# Patient Record
Sex: FEMALE | Race: BLACK OR AFRICAN AMERICAN | Employment: UNEMPLOYED | URBAN - METROPOLITAN AREA
[De-identification: names, ages, dates, MRNs, and addresses within clinical notes are randomized per-mention and may not be internally consistent; named-entity substitution may affect disease eponyms.]

---

## 2017-04-26 RX ORDER — HYDROCHLOROTHIAZIDE 25 MG/1
TABLET ORAL
Qty: 30 TABLET | Refills: 0 | Status: SHIPPED | OUTPATIENT
Start: 2017-04-26 | End: 2017-05-25

## 2017-05-12 ENCOUNTER — OFFICE VISIT (OUTPATIENT)
Dept: OBGYN CLINIC | Facility: CLINIC | Age: 52
End: 2017-05-12

## 2017-05-12 VITALS
DIASTOLIC BLOOD PRESSURE: 84 MMHG | HEART RATE: 84 BPM | BODY MASS INDEX: 34.49 KG/M2 | WEIGHT: 202 LBS | HEIGHT: 64 IN | SYSTOLIC BLOOD PRESSURE: 137 MMHG

## 2017-05-12 DIAGNOSIS — Z12.4 CERVICAL CANCER SCREENING: ICD-10-CM

## 2017-05-12 DIAGNOSIS — Z12.31 ENCOUNTER FOR SCREENING MAMMOGRAM FOR BREAST CANCER: ICD-10-CM

## 2017-05-12 DIAGNOSIS — Z01.419 ENCOUNTER FOR GYNECOLOGICAL EXAMINATION WITHOUT ABNORMAL FINDING: Primary | ICD-10-CM

## 2017-05-12 PROCEDURE — 99386 PREV VISIT NEW AGE 40-64: CPT | Performed by: OBSTETRICS & GYNECOLOGY

## 2017-05-12 NOTE — PROGRESS NOTES
Well Woman Exam    HPI:  The patient is a 48 yo female who presents for annual exam. She was seen lat by CAP in 2013. She has a long history of abnormal pap smears with last pap HSIL and LATONIA II-III on colpo.  She was to get LEEP and declined so follow up wa Rfl:   •  ibuprofen (MOTRIN) 800 MG Oral Tab, Take 1 tablet by mouth every 8 (eight) hours as needed for Pain., Disp: 30 tablet, Rfl: 2    ALLERGIES:  No Known Allergies      Review of Systems:  Constitutional:  Denies fevers and chills   Cardiovascular: close follow up discussed at length with the patient. 2. Ana Laura-Menopause  1. Plan to monitor menses  2. Continue with fan and layers for hot flashes at this time   3. Breast Health:     1.  Reviewed currently guidelines with the patient and to start [facility]

## 2017-05-16 ENCOUNTER — TELEPHONE (OUTPATIENT)
Dept: OBGYN CLINIC | Facility: CLINIC | Age: 52
End: 2017-05-16

## 2017-05-16 DIAGNOSIS — Z32.00 PREGNANCY EXAMINATION OR TEST, PREGNANCY UNCONFIRMED: Primary | ICD-10-CM

## 2017-05-16 NOTE — TELEPHONE ENCOUNTER
PT NOTIFIED OF RESULTS AND RECS. SHE IS AWARE TO DO A SERUM PREG TEST THE DAY BEFORE THE APPT SINCE SHE IS NOT MENOPAUSAL YET.  (LAST PERIOD WAS NOV 2016). ORDER PLACED.

## 2017-05-16 NOTE — TELEPHONE ENCOUNTER
----- Message from Sommer Faye MD sent at 5/16/2017 12:53 PM CDT -----  Please let patient know that she had another abnormal pap smear. She needs to have a colposcopy. It is very important she shows up and makes an appointment for this.  Please follow

## 2017-05-25 RX ORDER — HYDROCHLOROTHIAZIDE 25 MG/1
TABLET ORAL
Qty: 30 TABLET | Refills: 0 | Status: SHIPPED | OUTPATIENT
Start: 2017-05-25 | End: 2017-06-23

## 2017-06-07 ENCOUNTER — OFFICE VISIT (OUTPATIENT)
Dept: OBGYN CLINIC | Facility: CLINIC | Age: 52
End: 2017-06-07

## 2017-06-07 VITALS — BODY MASS INDEX: 34 KG/M2 | SYSTOLIC BLOOD PRESSURE: 160 MMHG | DIASTOLIC BLOOD PRESSURE: 85 MMHG | WEIGHT: 199 LBS

## 2017-06-07 DIAGNOSIS — R87.611 PAP SMEAR OF CERVIX WITH ASCUS, CANNOT EXCLUDE HGSIL: ICD-10-CM

## 2017-06-07 DIAGNOSIS — Z01.812 PRE-PROCEDURAL LABORATORY EXAMINATION: Primary | ICD-10-CM

## 2017-06-07 DIAGNOSIS — R87.611 PAPANICOLAOU SMEAR OF CERVIX WITH ATYPICAL SQUAMOUS CELLS CANNOT EXCLUDE HIGH GRADE SQUAMOUS INTRAEPITHELIAL LESION (ASC-H): ICD-10-CM

## 2017-06-07 PROCEDURE — 88305 TISSUE EXAM BY PATHOLOGIST: CPT | Performed by: OBSTETRICS & GYNECOLOGY

## 2017-06-07 PROCEDURE — 57454 BX/CURETT OF CERVIX W/SCOPE: CPT | Performed by: OBSTETRICS & GYNECOLOGY

## 2017-06-07 PROCEDURE — 81025 URINE PREGNANCY TEST: CPT | Performed by: OBSTETRICS & GYNECOLOGY

## 2017-06-07 PROCEDURE — 88341 IMHCHEM/IMCYTCHM EA ADD ANTB: CPT | Performed by: OBSTETRICS & GYNECOLOGY

## 2017-06-07 NOTE — PROCEDURES
Colpo w/Cx Biopsy and ECC    Pregnancy Results: negative from urine test   Consent signed. Procedure discussed with patient in detail including indication, risk, benefits, alternatives and complications. Indication: ASC-H and HPV positive.  LATONIA II-III

## 2017-06-14 ENCOUNTER — OFFICE VISIT (OUTPATIENT)
Dept: FAMILY MEDICINE CLINIC | Facility: CLINIC | Age: 52
End: 2017-06-14

## 2017-06-14 VITALS
SYSTOLIC BLOOD PRESSURE: 148 MMHG | TEMPERATURE: 99 F | HEART RATE: 93 BPM | HEIGHT: 64 IN | BODY MASS INDEX: 35 KG/M2 | DIASTOLIC BLOOD PRESSURE: 79 MMHG | RESPIRATION RATE: 20 BRPM | WEIGHT: 205 LBS

## 2017-06-14 DIAGNOSIS — R05.9 COUGH: ICD-10-CM

## 2017-06-14 PROCEDURE — 99212 OFFICE O/P EST SF 10 MIN: CPT | Performed by: FAMILY MEDICINE

## 2017-06-14 PROCEDURE — 99213 OFFICE O/P EST LOW 20 MIN: CPT | Performed by: FAMILY MEDICINE

## 2017-06-14 RX ORDER — IBUPROFEN 800 MG/1
800 TABLET ORAL EVERY 8 HOURS PRN
Qty: 30 TABLET | Refills: 2 | Status: SHIPPED | OUTPATIENT
Start: 2017-06-14 | End: 2019-05-14

## 2017-06-14 RX ORDER — AZITHROMYCIN 250 MG/1
TABLET, FILM COATED ORAL
Qty: 6 TABLET | Refills: 0 | Status: SHIPPED | OUTPATIENT
Start: 2017-06-14 | End: 2019-05-14

## 2017-06-14 RX ORDER — PROMETHAZINE HYDROCHLORIDE AND CODEINE PHOSPHATE 6.25; 1 MG/5ML; MG/5ML
SYRUP ORAL
Qty: 240 ML | Refills: 0 | Status: SHIPPED | OUTPATIENT
Start: 2017-06-14 | End: 2019-05-14

## 2017-06-14 NOTE — PROGRESS NOTES
HPI:    Patient ID: Malick Zhao is a 47year old female. HPI Comments: Pt presents with cold symptoms for 7 days. Pt has had intermittent coughing fits. No fevers. Pt has tried otc remedies without relief. Pt states no sick contacts.        Cough Pulmonary/Chest: Effort normal and breath sounds normal. No respiratory distress. She has no wheezes. She has no rales. Lymphadenopathy:     She has no cervical adenopathy. Vitals reviewed.              ASSESSMENT/PLAN:   Cough: ? Allergies;  - After

## 2017-06-15 ENCOUNTER — TELEPHONE (OUTPATIENT)
Dept: OBGYN CLINIC | Facility: CLINIC | Age: 52
End: 2017-06-15

## 2017-06-15 NOTE — TELEPHONE ENCOUNTER
Patient needs referral to [facility] (Dr. Rigo Delgado or Dr. Mando Judge) for LATONIA III on ECC. If due to insurance she cannot go there then please send to Dr. Donna Norman.

## 2017-06-15 NOTE — TELEPHONE ENCOUNTER
Reviewed LATONIA II-III on ECC with patient. Recommend cone for patient and to have this done by specialist Carissa Zambrano. Reviewed they will treat her the best and do what is needed to help her.  She agrees and referral was sent

## 2017-06-16 NOTE — TELEPHONE ENCOUNTER
p no longer approving referrals to [facility] onc patient will be referred to kp castorena or mark anthony.

## 2017-06-24 RX ORDER — HYDROCHLOROTHIAZIDE 25 MG/1
TABLET ORAL
Qty: 30 TABLET | Refills: 0 | Status: SHIPPED | OUTPATIENT
Start: 2017-06-24 | End: 2019-05-14

## 2017-07-19 ENCOUNTER — OFFICE VISIT (OUTPATIENT)
Dept: FAMILY MEDICINE CLINIC | Facility: CLINIC | Age: 52
End: 2017-07-19

## 2017-07-19 VITALS — HEIGHT: 64 IN

## 2017-07-19 DIAGNOSIS — E11.9 DIET-CONTROLLED DIABETES MELLITUS (HCC): ICD-10-CM

## 2017-07-19 DIAGNOSIS — R35.0 FREQUENT URINATION: ICD-10-CM

## 2017-07-19 LAB
BILIRUBIN URINE: NEGATIVE
CONTROL RUN WITHIN 24 HOURS?: YES
LEUKOCYTE ESTERASE URINE: NEGATIVE
NITRITE URINE: POSITIVE
PH URINE: 5 (ref 5–8)
SPEC GRAVITY: 1.01 (ref 1–1.03)
UROBILINOGEN URINE: 0.2

## 2017-07-19 PROCEDURE — 99213 OFFICE O/P EST LOW 20 MIN: CPT | Performed by: FAMILY MEDICINE

## 2017-07-19 PROCEDURE — 99212 OFFICE O/P EST SF 10 MIN: CPT | Performed by: FAMILY MEDICINE

## 2017-07-19 RX ORDER — NITROFURANTOIN 25; 75 MG/1; MG/1
100 CAPSULE ORAL 2 TIMES DAILY
Qty: 14 CAPSULE | Refills: 0 | Status: SHIPPED | OUTPATIENT
Start: 2017-07-19 | End: 2019-05-14

## 2017-07-19 NOTE — PROGRESS NOTES
HPI:    Patient ID: Brianne Valadez is a 47year old female. Pt has had UTI symptoms for a week. Burning with urination. No fevers or flank pain/ back pains. Pt has hx of diet controlled diabetes.            Review of Systems   Constitutional: Robin Chester will check hgba1c as discussed below; To call if any significant symptoms. Follow up and further management after testing.          Orders Placed This Encounter      Hemoglobin A1C [E]      POCT urinalysis dipstick    Meds This Visit:  Signed Prescriptions

## 2017-08-17 NOTE — TELEPHONE ENCOUNTER
LMTCB. Pt needs to inform us if she seeing Bethanie Sanders or Maki Galvan, so a Referral can be sent.   Referral Nurse will need to be informed, so she can send a Referral.

## 2017-08-22 ENCOUNTER — TELEPHONE (OUTPATIENT)
Dept: INTERNAL MEDICINE CLINIC | Facility: CLINIC | Age: 52
End: 2017-08-22

## 2017-10-04 ENCOUNTER — TELEPHONE (OUTPATIENT)
Dept: INTERNAL MEDICINE CLINIC | Facility: CLINIC | Age: 52
End: 2017-10-04

## 2017-10-16 ENCOUNTER — TELEPHONE (OUTPATIENT)
Dept: PEDIATRICS CLINIC | Facility: CLINIC | Age: 52
End: 2017-10-16

## 2017-10-16 DIAGNOSIS — D06.9 CIN III (CERVICAL INTRAEPITHELIAL NEOPLASIA GRADE III) WITH SEVERE DYSPLASIA: Primary | ICD-10-CM

## 2017-10-16 NOTE — TELEPHONE ENCOUNTER
SEE 6-15-17 PHONE ENCOUNTER. PT HAD A TRIP PLANNED FOR A LONG TIME THAT SHE WENT ON SO THAT IS WHY SHE IS CALLING BACK NOW. PT WAS REFERRED TO [facility]. [address] Vania Hogan 08, [id number] [facility]

## 2017-10-17 NOTE — TELEPHONE ENCOUNTER
Referral in process, patient informed. Patient given scheduling info for [facility].  [phone]

## 2017-10-25 NOTE — TELEPHONE ENCOUNTER
Lmtcb. Please relay info:    Patient's approved referral and medical records were routed to Dr. Richard Tony office at [phone]. Patient can call [phone]  To schedule.

## 2017-11-20 ENCOUNTER — TELEPHONE (OUTPATIENT)
Dept: OBGYN CLINIC | Facility: CLINIC | Age: 52
End: 2017-11-20

## 2017-11-20 NOTE — TELEPHONE ENCOUNTER
Consult note from Dr Mohan Hi dated 11/15/17 placed on [facility]'s desk for review.

## 2017-12-20 ENCOUNTER — TELEPHONE (OUTPATIENT)
Dept: OBGYN CLINIC | Facility: CLINIC | Age: 52
End: 2017-12-20

## 2017-12-20 NOTE — TELEPHONE ENCOUNTER
Received letter from [facility] dated 12/20/17. Placed on [address] for review.

## 2017-12-20 NOTE — TELEPHONE ENCOUNTER
Received letter from [facility]. Patient has failed to follow up. Please call her to reminder her to follow  Up with them as it is very important.  Certified letter can then be sent to her that she needs to follow up regarding Grade III

## 2017-12-28 NOTE — TELEPHONE ENCOUNTER
LMTCB-second call. Certified letter sent. Routed to Landon Chavez as an [place].

## 2017-12-28 NOTE — TELEPHONE ENCOUNTER
Pt states that her spouse lost his job so she lost her insurance. Pt states that she will have new insurance 1/18. Pt will follow up with them in January. Certified letter not sent since we were able to reach the pt.

## 2019-05-14 ENCOUNTER — OFFICE VISIT (OUTPATIENT)
Dept: FAMILY MEDICINE CLINIC | Facility: CLINIC | Age: 54
End: 2019-05-14
Payer: COMMERCIAL

## 2019-05-14 VITALS
HEART RATE: 80 BPM | RESPIRATION RATE: 18 BRPM | HEIGHT: 64 IN | SYSTOLIC BLOOD PRESSURE: 135 MMHG | WEIGHT: 196 LBS | DIASTOLIC BLOOD PRESSURE: 83 MMHG | TEMPERATURE: 98 F | BODY MASS INDEX: 33.46 KG/M2

## 2019-05-14 DIAGNOSIS — Z12.11 COLON CANCER SCREENING: ICD-10-CM

## 2019-05-14 DIAGNOSIS — L98.9 SKIN LESION: ICD-10-CM

## 2019-05-14 DIAGNOSIS — Z00.00 ROUTINE PHYSICAL EXAMINATION: Primary | ICD-10-CM

## 2019-05-14 DIAGNOSIS — I10 ESSENTIAL HYPERTENSION: ICD-10-CM

## 2019-05-14 DIAGNOSIS — Z12.31 SCREENING MAMMOGRAM, ENCOUNTER FOR: ICD-10-CM

## 2019-05-14 PROCEDURE — 99396 PREV VISIT EST AGE 40-64: CPT | Performed by: FAMILY MEDICINE

## 2019-05-14 RX ORDER — HYDROCHLOROTHIAZIDE 25 MG/1
25 TABLET ORAL
Qty: 90 TABLET | Refills: 3 | Status: SHIPPED | OUTPATIENT
Start: 2019-05-14 | End: 2021-04-20

## 2019-05-14 NOTE — PROGRESS NOTES
HPI:    Patient ID: Jason Choudhary is a 53year old female. Patient is here for routine physical exam. No acute issues. Patient is requesting blood testing. Past medical history, family history, and social history were reviewed.   Patient is here fo clear and moist.   Eyes: Pupils are equal, round, and reactive to light. Conjunctivae and EOM are normal.   Neck: Normal range of motion. Neck supple. Cardiovascular: Normal rate, regular rhythm, normal heart sounds and intact distal pulses.    Pulmonary/ daily.       Imaging & Referrals:  [facility] - INTERNAL  [facility]M - INTERNAL  [facility] SCREENING [facility]ILAT (CPT=[id number])       YJ#[id number]

## 2019-08-22 ENCOUNTER — OFFICE VISIT (OUTPATIENT)
Dept: FAMILY MEDICINE CLINIC | Facility: CLINIC | Age: 54
End: 2019-08-22
Payer: COMMERCIAL

## 2019-08-22 VITALS
HEIGHT: 64 IN | WEIGHT: 195 LBS | DIASTOLIC BLOOD PRESSURE: 79 MMHG | BODY MASS INDEX: 33.29 KG/M2 | TEMPERATURE: 99 F | RESPIRATION RATE: 18 BRPM | SYSTOLIC BLOOD PRESSURE: 136 MMHG | HEART RATE: 90 BPM

## 2019-08-22 DIAGNOSIS — R30.0 BURNING WITH URINATION: ICD-10-CM

## 2019-08-22 DIAGNOSIS — Z01.419 ROUTINE GYNECOLOGICAL EXAMINATION: ICD-10-CM

## 2019-08-22 LAB
BILIRUBIN URINE: NEGATIVE
CONTROL RUN WITHIN 24 HOURS?: YES
KETONE URINE: NEGATIVE
NITRITE URINE: NEGATIVE
PH URINE: 6 (ref 5–8)
SPEC GRAVITY: 1 (ref 1–1.03)
URINE COLOR: YELLOW
UROBILINOGEN URINE: 0.2

## 2019-08-22 PROCEDURE — 99213 OFFICE O/P EST LOW 20 MIN: CPT | Performed by: FAMILY MEDICINE

## 2019-08-22 RX ORDER — NITROFURANTOIN 25; 75 MG/1; MG/1
100 CAPSULE ORAL 2 TIMES DAILY
Qty: 14 CAPSULE | Refills: 0 | Status: SHIPPED | OUTPATIENT
Start: 2019-08-22 | End: 2021-04-20

## 2019-09-26 ENCOUNTER — NURSE TRIAGE (OUTPATIENT)
Dept: OTHER | Age: 54
End: 2019-09-26

## 2019-09-26 NOTE — TELEPHONE ENCOUNTER
Action Requested: Summary for Provider     []  Critical Lab, Recommendations Needed  [x] Need Additional Advice  []   FYI    []   Need Orders  [] Need Medications Sent to Pharmacy  []  Other     SUMMARY:   Patient calling stating she is having \"gas pain\"

## 2020-03-23 ENCOUNTER — NURSE TRIAGE (OUTPATIENT)
Dept: OTHER | Age: 55
End: 2020-03-23

## 2020-03-23 NOTE — TELEPHONE ENCOUNTER
Action Requested: Summary for Provider     []  Critical Lab, Recommendations Needed  [] Need Additional Advice  []   FYI    []   Need Orders  [] Need Medications Sent to Pharmacy  []  Other     SUMMARY: Travel Screening Completed    Patient started having

## 2020-04-03 ENCOUNTER — NURSE TRIAGE (OUTPATIENT)
Dept: OTHER | Age: 55
End: 2020-04-03

## 2020-04-03 DIAGNOSIS — J45.20 MILD INTERMITTENT ASTHMA WITHOUT COMPLICATION: ICD-10-CM

## 2020-04-03 DIAGNOSIS — Z20.822 EXPOSURE TO COVID-19 VIRUS: ICD-10-CM

## 2020-04-03 PROCEDURE — 99212 OFFICE O/P EST SF 10 MIN: CPT | Performed by: FAMILY MEDICINE

## 2020-04-03 NOTE — TELEPHONE ENCOUNTER
Action Requested: Summary for Provider     []  Critical Lab, Recommendations Needed  [] Need Additional Advice  []   FYI    []   Need Orders  [] Need Medications Sent to Pharmacy  []  Other     SUMMARY:     Per Covid 19 Protocol  The patient's  was

## 2020-04-04 NOTE — TELEPHONE ENCOUNTER
Attempted to call but no answer voice message left again. If sig symptoms to go to ER or urgent care. Can call back if questions/symptoms. Will be back in office on Monday. Otherwise to be addressed by on call MD. Will notify on call if pt calls back.   To

## 2020-04-07 RX ORDER — PROMETHAZINE HYDROCHLORIDE AND CODEINE PHOSPHATE 6.25; 1 MG/5ML; MG/5ML
SYRUP ORAL
Qty: 240 ML | Refills: 0 | Status: SHIPPED | OUTPATIENT
Start: 2020-04-07 | End: 2021-04-20

## 2020-04-07 RX ORDER — ALBUTEROL SULFATE 90 UG/1
2 AEROSOL, METERED RESPIRATORY (INHALATION) EVERY 4 HOURS PRN
Qty: 1 INHALER | Refills: 2 | Status: SHIPPED | OUTPATIENT
Start: 2020-04-07

## 2020-04-07 RX ORDER — PREDNISONE 20 MG/1
TABLET ORAL
Qty: 12 TABLET | Refills: 0 | Status: SHIPPED | OUTPATIENT
Start: 2020-04-07 | End: 2021-04-20

## 2020-04-07 NOTE — TELEPHONE ENCOUNTER
Pt rerunning calls; states had listened to all voicemail's including Dr Dominguez Parikh. [facility] has been controlling ti with an albuterol inhaler. States still having cough and \"controlling shortness of breath with albuterol inhaler. \"    Asking if Dr Felipe Molina can call

## 2020-04-07 NOTE — TELEPHONE ENCOUNTER
Virtual/Telephone Check-In    Meryle Childes verbally consents to a Virtual/Telephone Check-In service on 04/07/20. Patient understands and accepts financial responsibility for any deductible, co-insurance and/or co-pays associated with this service.

## 2020-04-13 ENCOUNTER — TELEPHONE (OUTPATIENT)
Dept: FAMILY MEDICINE CLINIC | Facility: CLINIC | Age: 55
End: 2020-04-13

## 2020-04-13 NOTE — TELEPHONE ENCOUNTER
Pt contacted and doing well. No sig asthma or resp symptoms. No fevers  Pt was exposed to COVID 19  - to call if any sig symptoms; To practice self isolation and quarantining as discussed. To continue social distancing and good hygiene.   Patient verbalize

## 2021-04-14 ENCOUNTER — IMMUNIZATION (OUTPATIENT)
Dept: LAB | Age: 56
End: 2021-04-14
Attending: HOSPITALIST
Payer: COMMERCIAL

## 2021-04-14 DIAGNOSIS — Z23 NEED FOR VACCINATION: Primary | ICD-10-CM

## 2021-04-14 PROCEDURE — 0001A SARSCOV2 VAC 30MCG/0.3ML IM: CPT

## 2021-04-20 ENCOUNTER — OFFICE VISIT (OUTPATIENT)
Dept: FAMILY MEDICINE CLINIC | Facility: CLINIC | Age: 56
End: 2021-04-20
Payer: COMMERCIAL

## 2021-04-20 VITALS
TEMPERATURE: 97 F | HEART RATE: 88 BPM | DIASTOLIC BLOOD PRESSURE: 82 MMHG | HEIGHT: 64 IN | WEIGHT: 197 LBS | BODY MASS INDEX: 33.63 KG/M2 | RESPIRATION RATE: 20 BRPM | SYSTOLIC BLOOD PRESSURE: 134 MMHG

## 2021-04-20 DIAGNOSIS — Z12.31 VISIT FOR SCREENING MAMMOGRAM: ICD-10-CM

## 2021-04-20 DIAGNOSIS — Z12.11 COLON CANCER SCREENING: ICD-10-CM

## 2021-04-20 DIAGNOSIS — Z00.00 ROUTINE PHYSICAL EXAMINATION: Primary | ICD-10-CM

## 2021-04-20 DIAGNOSIS — I10 ESSENTIAL HYPERTENSION: ICD-10-CM

## 2021-04-20 PROCEDURE — 99396 PREV VISIT EST AGE 40-64: CPT | Performed by: FAMILY MEDICINE

## 2021-04-20 PROCEDURE — 3008F BODY MASS INDEX DOCD: CPT | Performed by: FAMILY MEDICINE

## 2021-04-20 PROCEDURE — 3079F DIAST BP 80-89 MM HG: CPT | Performed by: FAMILY MEDICINE

## 2021-04-20 PROCEDURE — 3075F SYST BP GE 130 - 139MM HG: CPT | Performed by: FAMILY MEDICINE

## 2021-04-20 RX ORDER — MOMETASONE FUROATE 1 MG/G
OINTMENT TOPICAL
Qty: 1 TUBE | Refills: 2 | Status: SHIPPED | OUTPATIENT
Start: 2021-04-20

## 2021-04-20 NOTE — PROGRESS NOTES
HPI/Subjective:   Patient ID: Tadeo Jurado is a 61year old female. Patient is here for routine physical exam. No acute issues. No significant chronic medical problems. Patient is requesting blood testing. Diet and exercise have been good.  Pt has Tympanic membrane, ear canal and external ear normal.      Left Ear: Tympanic membrane, ear canal and external ear normal.      Nose: Nose normal.      Mouth/Throat:      Mouth: Mucous membranes are moist.      Pharynx: No posterior oropharyngeal erythema. hypertension: pt is managing with diet/ activity  - To monitor blood pressure at home; To call if any persistent elevation of blood pressure; Discussed good diet and activity; Follow up as needed.        Orders Placed This Encounter      Lipid Panel [E]

## 2021-04-21 ENCOUNTER — TELEPHONE (OUTPATIENT)
Dept: FAMILY MEDICINE CLINIC | Facility: CLINIC | Age: 56
End: 2021-04-21

## 2021-04-21 NOTE — TELEPHONE ENCOUNTER
Last annual physical 4-21-21 with . Mammogram re-ordered at that time. Last mammogram done 8-. Left detailed message offering to assist patient with scheduling her mammogram ordered by her pcp.    Encouraged to call the office or schedule

## 2021-05-05 ENCOUNTER — IMMUNIZATION (OUTPATIENT)
Dept: LAB | Age: 56
End: 2021-05-05
Attending: HOSPITALIST
Payer: COMMERCIAL

## 2021-05-05 DIAGNOSIS — Z23 NEED FOR VACCINATION: Primary | ICD-10-CM

## 2021-05-05 PROCEDURE — 0002A SARSCOV2 VAC 30MCG/0.3ML IM: CPT

## 2021-05-07 ENCOUNTER — TELEPHONE (OUTPATIENT)
Dept: FAMILY MEDICINE CLINIC | Facility: CLINIC | Age: 56
End: 2021-05-07

## 2021-05-10 ENCOUNTER — HOSPITAL ENCOUNTER (EMERGENCY)
Facility: HOSPITAL | Age: 56
Discharge: HOME | End: 2021-05-10
Attending: EMERGENCY MEDICINE
Payer: COMMERCIAL

## 2021-05-10 ENCOUNTER — APPOINTMENT (OUTPATIENT)
Dept: GENERAL RADIOLOGY | Facility: HOSPITAL | Age: 56
End: 2021-05-10
Attending: EMERGENCY MEDICINE
Payer: COMMERCIAL

## 2021-05-10 ENCOUNTER — APPOINTMENT (OUTPATIENT)
Dept: CT IMAGING | Facility: HOSPITAL | Age: 56
End: 2021-05-10
Attending: EMERGENCY MEDICINE
Payer: COMMERCIAL

## 2021-05-10 VITALS
HEART RATE: 82 BPM | SYSTOLIC BLOOD PRESSURE: 150 MMHG | OXYGEN SATURATION: 97 % | HEIGHT: 67 IN | DIASTOLIC BLOOD PRESSURE: 70 MMHG | BODY MASS INDEX: 30.79 KG/M2 | TEMPERATURE: 98 F | RESPIRATION RATE: 18 BRPM | WEIGHT: 196.19 LBS

## 2021-05-10 DIAGNOSIS — S46.912A SHOULDER STRAIN, LEFT, INITIAL ENCOUNTER: ICD-10-CM

## 2021-05-10 DIAGNOSIS — S46.911A SHOULDER STRAIN, RIGHT, INITIAL ENCOUNTER: ICD-10-CM

## 2021-05-10 DIAGNOSIS — S80.01XA CONTUSION OF RIGHT KNEE, INITIAL ENCOUNTER: ICD-10-CM

## 2021-05-10 DIAGNOSIS — S09.8XXA BLUNT HEAD TRAUMA, INITIAL ENCOUNTER: ICD-10-CM

## 2021-05-10 DIAGNOSIS — S93.402A MODERATE LEFT ANKLE SPRAIN, INITIAL ENCOUNTER: Primary | ICD-10-CM

## 2021-05-10 PROCEDURE — 73030 X-RAY EXAM OF SHOULDER: CPT | Performed by: EMERGENCY MEDICINE

## 2021-05-10 PROCEDURE — 73610 X-RAY EXAM OF ANKLE: CPT | Performed by: EMERGENCY MEDICINE

## 2021-05-10 PROCEDURE — 99284 EMERGENCY DEPT VISIT MOD MDM: CPT

## 2021-05-10 PROCEDURE — 70450 CT HEAD/BRAIN W/O DYE: CPT | Performed by: EMERGENCY MEDICINE

## 2021-05-10 RX ORDER — ORPHENADRINE CITRATE 100 MG/1
100 TABLET, EXTENDED RELEASE ORAL 2 TIMES DAILY PRN
Qty: 20 TABLET | Refills: 0 | Status: SHIPPED | OUTPATIENT
Start: 2021-05-10 | End: 2021-05-17

## 2021-05-10 RX ORDER — IBUPROFEN 600 MG/1
600 TABLET ORAL EVERY 6 HOURS PRN
Qty: 30 TABLET | Refills: 0 | Status: SHIPPED | OUTPATIENT
Start: 2021-05-10 | End: 2021-05-17

## 2021-05-10 NOTE — ED INITIAL ASSESSMENT (HPI)
Tripped and fell pta. C/o left ankle pain, right knee pain, and right shoulder pain. ambulatory with increased pain. +Cms to all extremities. Denies head injury/loc.

## 2021-05-11 NOTE — ED PROVIDER NOTES
Patient Seen in: [facility] AND [facility] Emergency Department      History   Patient presents with:  Fall    Stated Complaint: FALL    HPI/Subjective:   HPI    61year old female who had mechanical fall just pta hurting left ankle, R knee, bilateral shoulders Dorsalis pedis pulses are 2+ on the right side and 2+ on the left side. Pulmonary:      Effort: Pulmonary effort is normal. No respiratory distress. Musculoskeletal:      Right shoulder: Tenderness present. Normal range of motion. Normal strength. No acute fracture dislocation. 2. Glenohumeral degeneration. Moderate hypertrophic degeneration right AC joint. 3. Degenerative changes greater tuberosity.  4. Dextroscoliosis and multilevel thoracic spondylosis    Dictated by (CST): Shalonda Petersen MD times daily as needed (muscle spasm). Do not drive or operate heavy machinery while taking this medication. , Normal, Disp-20 tablet, R-0    lidocaine-menthol 4-1 % External Patch  Place 1 patch onto the skin Q24H PRN (pain). , Normal, Disp-30 patch, R-0

## 2021-07-29 NOTE — PROGRESS NOTES
HPI:    Patient ID: Fabio Gonsalez is a 53year old female. Pt has had UTI symptoms for a couple days. Burning with urination with frequency and urgency. No fevers or flank pain/ back pains. No GI symptoms.      Pt is also looking for a gynecologist times daily.        Imaging & Referrals:  [facility] - INTERNAL       XM#[id number] 29-Jul-2021 12:55

## 2021-11-30 ENCOUNTER — IMMUNIZATION (OUTPATIENT)
Dept: LAB | Facility: HOSPITAL | Age: 56
End: 2021-11-30
Attending: EMERGENCY MEDICINE
Payer: COMMERCIAL

## 2021-11-30 DIAGNOSIS — Z23 NEED FOR VACCINATION: Primary | ICD-10-CM

## 2021-11-30 PROCEDURE — 0004A SARSCOV2 VAC 30MCG/0.3ML IM: CPT

## 2022-02-09 ENCOUNTER — APPOINTMENT (OUTPATIENT)
Dept: GENERAL RADIOLOGY | Age: 57
End: 2022-02-09
Attending: NURSE PRACTITIONER
Payer: COMMERCIAL

## 2022-02-09 ENCOUNTER — HOSPITAL ENCOUNTER (OUTPATIENT)
Age: 57
Discharge: HOME | End: 2022-02-09
Payer: COMMERCIAL

## 2022-02-09 ENCOUNTER — NURSE TRIAGE (OUTPATIENT)
Dept: FAMILY MEDICINE CLINIC | Facility: CLINIC | Age: 57
End: 2022-02-09

## 2022-02-09 VITALS
BODY MASS INDEX: 31.58 KG/M2 | DIASTOLIC BLOOD PRESSURE: 70 MMHG | HEART RATE: 85 BPM | SYSTOLIC BLOOD PRESSURE: 159 MMHG | WEIGHT: 185 LBS | HEIGHT: 64 IN | RESPIRATION RATE: 16 BRPM | OXYGEN SATURATION: 100 % | TEMPERATURE: 98 F
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
## 2022-02-09 PROCEDURE — 99203 OFFICE O/P NEW LOW 30 MIN: CPT | Performed by: NURSE PRACTITIONER

## 2022-02-09 PROCEDURE — 73110 X-RAY EXAM OF WRIST: CPT | Performed by: NURSE PRACTITIONER

## 2022-02-09 NOTE — ED INITIAL ASSESSMENT (HPI)
Patient fell on Saturday walking out to her car and landed on her right hand and wrist.  She is having right wrist pain still and some swelling.

## 2022-02-11 ENCOUNTER — TELEPHONE (OUTPATIENT)
Dept: FAMILY MEDICINE CLINIC | Facility: CLINIC | Age: 57
End: 2022-02-11

## 2022-02-13 RX ORDER — IBUPROFEN 800 MG/1
800 TABLET ORAL EVERY 8 HOURS PRN
Qty: 30 TABLET | Refills: 2 | Status: SHIPPED | OUTPATIENT
Start: 2022-02-13

## 2022-06-28 ENCOUNTER — HOSPITAL ENCOUNTER (EMERGENCY)
Facility: HOSPITAL | Age: 57
Discharge: HOME | End: 2022-06-28
Attending: EMERGENCY MEDICINE
Payer: COMMERCIAL

## 2022-06-28 VITALS
SYSTOLIC BLOOD PRESSURE: 144 MMHG | WEIGHT: 185 LBS | TEMPERATURE: 98 F | BODY MASS INDEX: 31.58 KG/M2 | DIASTOLIC BLOOD PRESSURE: 90 MMHG | RESPIRATION RATE: 16 BRPM | HEIGHT: 64 IN | OXYGEN SATURATION: 99 % | HEART RATE: 82 BPM

## 2022-06-28 DIAGNOSIS — M25.511 ACUTE PAIN OF RIGHT SHOULDER: Primary | ICD-10-CM

## 2022-06-28 PROCEDURE — 99283 EMERGENCY DEPT VISIT LOW MDM: CPT

## 2022-06-28 PROCEDURE — 96372 THER/PROPH/DIAG INJ SC/IM: CPT

## 2022-06-28 RX ORDER — KETOROLAC TROMETHAMINE 10 MG/1
10 TABLET, FILM COATED ORAL EVERY 6 HOURS PRN
Qty: 20 TABLET | Refills: 0 | Status: SHIPPED | OUTPATIENT
Start: 2022-06-28 | End: 2022-07-03

## 2022-06-28 RX ORDER — HYDROCODONE BITARTRATE AND ACETAMINOPHEN 5; 325 MG/1; MG/1
1 TABLET ORAL EVERY 6 HOURS PRN
Qty: 15 TABLET | Refills: 0 | Status: SHIPPED | OUTPATIENT
Start: 2022-06-28

## 2022-06-28 RX ORDER — KETOROLAC TROMETHAMINE 30 MG/ML
30 INJECTION, SOLUTION INTRAMUSCULAR; INTRAVENOUS ONCE
Status: COMPLETED | OUTPATIENT
Start: 2022-06-28 | End: 2022-06-28

## 2022-06-28 RX ORDER — HYDROCODONE BITARTRATE AND ACETAMINOPHEN 5; 325 MG/1; MG/1
1 TABLET ORAL ONCE
Status: COMPLETED | OUTPATIENT
Start: 2022-06-28 | End: 2022-06-28

## 2022-06-28 NOTE — ED INITIAL ASSESSMENT (HPI)
Reports right shoulder pain started a couple of days ago. No known trauma. Radiates to right arm, denies numbness or tingling.

## 2022-06-29 ENCOUNTER — TELEPHONE (OUTPATIENT)
Dept: NEUROLOGY | Facility: CLINIC | Age: 57
End: 2022-06-29

## 2022-06-29 NOTE — TELEPHONE ENCOUNTER
Spoke to patient and ask if she was in need of on appointment and she states she will be reaching out to her PCP and the follow up with  if needed.

## 2022-07-08 ENCOUNTER — IMMUNIZATION (OUTPATIENT)
Dept: LAB | Age: 57
End: 2022-07-08
Attending: EMERGENCY MEDICINE
Payer: COMMERCIAL

## 2022-07-08 DIAGNOSIS — Z23 NEED FOR VACCINATION: Primary | ICD-10-CM

## 2022-07-08 PROCEDURE — 0054A SARSCOV2 VAC 30MCG TRS SUCR: CPT

## 2023-01-08 ENCOUNTER — IMMUNIZATION (OUTPATIENT)
Dept: LAB | Age: 58
End: 2023-01-08
Attending: EMERGENCY MEDICINE
Payer: COMMERCIAL

## 2023-01-08 DIAGNOSIS — Z23 NEED FOR VACCINATION: Primary | ICD-10-CM

## 2023-01-08 PROCEDURE — 0124A SARSCOV2 VAC BVL 30MCG/0.3ML: CPT

## 2023-03-24 ENCOUNTER — TELEPHONE (OUTPATIENT)
Dept: FAMILY MEDICINE CLINIC | Facility: CLINIC | Age: 58
End: 2023-03-24

## 2023-03-24 NOTE — TELEPHONE ENCOUNTER
Left message on pt voicemail to see if Dr. Maximino Arreola still her PCP if, so she needs to schedule Px with Dr. Maximino Arreola Last seen 4/20/2021. If not Please let us know so that we can remove Dr. Maximino Arreola as her PCP. Kelton Santos PSR or  please try patient again.   Thanks

## 2023-04-11 ENCOUNTER — OFFICE VISIT (OUTPATIENT)
Dept: FAMILY MEDICINE CLINIC | Facility: CLINIC | Age: 58
End: 2023-04-11

## 2023-04-11 VITALS
SYSTOLIC BLOOD PRESSURE: 134 MMHG | BODY MASS INDEX: 32.1 KG/M2 | DIASTOLIC BLOOD PRESSURE: 80 MMHG | HEIGHT: 64 IN | TEMPERATURE: 99 F | RESPIRATION RATE: 20 BRPM | HEART RATE: 81 BPM | WEIGHT: 188 LBS

## 2023-04-11 DIAGNOSIS — Z12.31 VISIT FOR SCREENING MAMMOGRAM: ICD-10-CM

## 2023-04-11 DIAGNOSIS — Z12.11 COLON CANCER SCREENING: ICD-10-CM

## 2023-04-11 DIAGNOSIS — Z00.00 ROUTINE PHYSICAL EXAMINATION: Primary | ICD-10-CM

## 2023-04-11 PROCEDURE — 3008F BODY MASS INDEX DOCD: CPT | Performed by: FAMILY MEDICINE

## 2023-04-11 PROCEDURE — 99396 PREV VISIT EST AGE 40-64: CPT | Performed by: FAMILY MEDICINE

## 2023-04-11 PROCEDURE — 3079F DIAST BP 80-89 MM HG: CPT | Performed by: FAMILY MEDICINE

## 2023-04-11 PROCEDURE — 3075F SYST BP GE 130 - 139MM HG: CPT | Performed by: FAMILY MEDICINE

## 2023-06-23 ENCOUNTER — NURSE TRIAGE (OUTPATIENT)
Dept: FAMILY MEDICINE CLINIC | Facility: CLINIC | Age: 58
End: 2023-06-23

## 2023-06-23 ENCOUNTER — HOSPITAL ENCOUNTER (EMERGENCY)
Facility: HOSPITAL | Age: 58
Discharge: HOME | End: 2023-06-23
Attending: EMERGENCY MEDICINE
Payer: COMMERCIAL

## 2023-06-23 VITALS
OXYGEN SATURATION: 100 % | RESPIRATION RATE: 16 BRPM | DIASTOLIC BLOOD PRESSURE: 95 MMHG | TEMPERATURE: 99 F | SYSTOLIC BLOOD PRESSURE: 171 MMHG | HEART RATE: 78 BPM

## 2023-06-23 DIAGNOSIS — I10 ASYMPTOMATIC HYPERTENSION: Primary | ICD-10-CM

## 2023-06-23 PROCEDURE — 99283 EMERGENCY DEPT VISIT LOW MDM: CPT

## 2023-06-23 PROCEDURE — 99284 EMERGENCY DEPT VISIT MOD MDM: CPT

## 2023-06-23 RX ORDER — AMLODIPINE BESYLATE 5 MG/1
5 TABLET ORAL DAILY
Qty: 14 TABLET | Refills: 0 | Status: SHIPPED | OUTPATIENT
Start: 2023-06-23 | End: 2023-07-07

## 2023-06-23 RX ORDER — AMLODIPINE BESYLATE 5 MG/1
5 TABLET ORAL ONCE
Status: COMPLETED | OUTPATIENT
Start: 2023-06-23 | End: 2023-06-23

## 2023-06-23 NOTE — ED INITIAL ASSESSMENT (HPI)
Patient ambulatory to triage, c/o high blood pressure - SBP in the 200's at home. Patient stated she has been starting to take an OTC medication called provitalize. Since she started taking it her blood pressure has been increasing.  BP of 178/90 in triage

## 2023-08-23 ENCOUNTER — LAB ENCOUNTER (OUTPATIENT)
Dept: LAB | Facility: HOSPITAL | Age: 58
End: 2023-08-23
Attending: FAMILY MEDICINE
Payer: COMMERCIAL

## 2023-08-23 ENCOUNTER — HOSPITAL ENCOUNTER (OUTPATIENT)
Dept: MAMMOGRAPHY | Facility: HOSPITAL | Age: 58
Discharge: HOME | End: 2023-08-23
Attending: FAMILY MEDICINE
Payer: COMMERCIAL

## 2023-08-23 DIAGNOSIS — Z00.00 ROUTINE PHYSICAL EXAMINATION: ICD-10-CM

## 2023-08-23 DIAGNOSIS — Z12.31 VISIT FOR SCREENING MAMMOGRAM: ICD-10-CM

## 2023-08-23 LAB
ALBUMIN SERPL-MCNC: 4 G/DL (ref 3.4–5)
ALBUMIN/GLOB SERPL: 1.3 {RATIO} (ref 1–2)
ALP LIVER SERPL-CCNC: 107 U/L
ALT SERPL-CCNC: 17 U/L
ANION GAP SERPL CALC-SCNC: 5 MMOL/L (ref 0–18)
AST SERPL-CCNC: 12 U/L (ref 15–37)
BILIRUB SERPL-MCNC: 0.5 MG/DL (ref 0.1–2)
BUN BLD-MCNC: 8 MG/DL (ref 7–18)
BUN/CREAT SERPL: 13.3 (ref 10–20)
CALCIUM BLD-MCNC: 9.4 MG/DL (ref 8.5–10.1)
CHLORIDE SERPL-SCNC: 101 MMOL/L (ref 98–112)
CHOLEST SERPL-MCNC: 194 MG/DL (ref ?–200)
CO2 SERPL-SCNC: 31 MMOL/L (ref 21–32)
CREAT BLD-MCNC: 0.6 MG/DL
DEPRECATED RDW RBC AUTO: 41.9 FL (ref 35.1–46.3)
EGFRCR SERPLBLD CKD-EPI 2021: 103 ML/MIN/1.73M2 (ref 60–?)
ERYTHROCYTE [DISTWIDTH] IN BLOOD BY AUTOMATED COUNT: 12.9 % (ref 11–15)
EST. AVERAGE GLUCOSE BLD GHB EST-MCNC: 301 MG/DL (ref 68–126)
FASTING PATIENT LIPID ANSWER: YES
FASTING STATUS PATIENT QL REPORTED: YES
GLOBULIN PLAS-MCNC: 3.1 G/DL (ref 2.8–4.4)
GLUCOSE BLD-MCNC: 259 MG/DL (ref 70–99)
HBA1C MFR BLD: 12.1 % (ref ?–5.7)
HCT VFR BLD AUTO: 42.5 %
HDLC SERPL-MCNC: 64 MG/DL (ref 40–59)
HGB BLD-MCNC: 13.5 G/DL
LDLC SERPL CALC-MCNC: 113 MG/DL (ref ?–100)
MCH RBC QN AUTO: 28.1 PG (ref 26–34)
MCHC RBC AUTO-ENTMCNC: 31.8 G/DL (ref 31–37)
MCV RBC AUTO: 88.4 FL
NONHDLC SERPL-MCNC: 130 MG/DL (ref ?–130)
OSMOLALITY SERPL CALC.SUM OF ELEC: 291 MOSM/KG (ref 275–295)
PLATELET # BLD AUTO: 224 10(3)UL (ref 150–450)
POTASSIUM SERPL-SCNC: 4.3 MMOL/L (ref 3.5–5.1)
PROT SERPL-MCNC: 7.1 G/DL (ref 6.4–8.2)
RBC # BLD AUTO: 4.81 X10(6)UL
SODIUM SERPL-SCNC: 137 MMOL/L (ref 136–145)
TRIGL SERPL-MCNC: 93 MG/DL (ref 30–149)
TSI SER-ACNC: 0.55 MIU/ML (ref 0.36–3.74)
VLDLC SERPL CALC-MCNC: 16 MG/DL (ref 0–30)
WBC # BLD AUTO: 6 X10(3) UL (ref 4–11)

## 2023-08-23 PROCEDURE — 80053 COMPREHEN METABOLIC PANEL: CPT

## 2023-08-23 PROCEDURE — 77067 SCR MAMMO BI INCL CAD: CPT | Performed by: FAMILY MEDICINE

## 2023-08-23 PROCEDURE — 80061 LIPID PANEL: CPT

## 2023-08-23 PROCEDURE — 36415 COLL VENOUS BLD VENIPUNCTURE: CPT

## 2023-08-23 PROCEDURE — 85027 COMPLETE CBC AUTOMATED: CPT

## 2023-08-23 PROCEDURE — 3046F HEMOGLOBIN A1C LEVEL >9.0%: CPT | Performed by: FAMILY MEDICINE

## 2023-08-23 PROCEDURE — 83036 HEMOGLOBIN GLYCOSYLATED A1C: CPT

## 2023-08-23 PROCEDURE — 77063 BREAST TOMOSYNTHESIS BI: CPT | Performed by: FAMILY MEDICINE

## 2023-08-23 PROCEDURE — 84443 ASSAY THYROID STIM HORMONE: CPT

## 2023-08-24 ENCOUNTER — TELEPHONE (OUTPATIENT)
Dept: FAMILY MEDICINE CLINIC | Facility: CLINIC | Age: 58
End: 2023-08-24

## 2023-08-24 NOTE — TELEPHONE ENCOUNTER
CSS- please assist with scheduling follow up appt with Dr Tova Calles as advised in result note. \"Pt to schedule appointment for follow up for elevated sugars/ diabetes. Will have triage staff to reach out to make appointment. Results released to [facility]"    [employer] message was also sent.

## 2023-08-28 NOTE — TELEPHONE ENCOUNTER
CSS, please assist     Please contact patient to schedule a follow up with Dr Dheeraj Davis. See Note below.

## 2023-09-11 ENCOUNTER — TELEPHONE (OUTPATIENT)
Dept: FAMILY MEDICINE CLINIC | Facility: CLINIC | Age: 58
End: 2023-09-11

## 2023-09-11 ENCOUNTER — OFFICE VISIT (OUTPATIENT)
Dept: FAMILY MEDICINE CLINIC | Facility: CLINIC | Age: 58
End: 2023-09-11

## 2023-09-11 ENCOUNTER — LAB ENCOUNTER (OUTPATIENT)
Dept: LAB | Facility: HOSPITAL | Age: 58
End: 2023-09-11
Attending: FAMILY MEDICINE
Payer: COMMERCIAL

## 2023-09-11 VITALS
RESPIRATION RATE: 16 BRPM | BODY MASS INDEX: 30.22 KG/M2 | HEART RATE: 78 BPM | WEIGHT: 177 LBS | SYSTOLIC BLOOD PRESSURE: 149 MMHG | HEIGHT: 64 IN | DIASTOLIC BLOOD PRESSURE: 80 MMHG | TEMPERATURE: 98 F

## 2023-09-11 DIAGNOSIS — E11.9 NEW ONSET TYPE 2 DIABETES MELLITUS (HCC): Primary | ICD-10-CM

## 2023-09-11 DIAGNOSIS — Z12.11 COLON CANCER SCREENING: ICD-10-CM

## 2023-09-11 DIAGNOSIS — I10 ESSENTIAL HYPERTENSION: ICD-10-CM

## 2023-09-11 DIAGNOSIS — R05.9 COUGH, UNSPECIFIED TYPE: ICD-10-CM

## 2023-09-11 LAB — HEMOCCULT STL QL: NEGATIVE

## 2023-09-11 PROCEDURE — 82274 ASSAY TEST FOR BLOOD FECAL: CPT

## 2023-09-11 PROCEDURE — 3008F BODY MASS INDEX DOCD: CPT | Performed by: FAMILY MEDICINE

## 2023-09-11 PROCEDURE — 99214 OFFICE O/P EST MOD 30 MIN: CPT | Performed by: FAMILY MEDICINE

## 2023-09-11 PROCEDURE — 3079F DIAST BP 80-89 MM HG: CPT | Performed by: FAMILY MEDICINE

## 2023-09-11 PROCEDURE — 3077F SYST BP >= 140 MM HG: CPT | Performed by: FAMILY MEDICINE

## 2023-09-11 RX ORDER — PROMETHAZINE HYDROCHLORIDE AND CODEINE PHOSPHATE 6.25; 1 MG/5ML; MG/5ML
SYRUP ORAL
Qty: 180 ML | Refills: 0 | Status: SHIPPED | OUTPATIENT
Start: 2023-09-11

## 2023-09-11 RX ORDER — AMLODIPINE BESYLATE 5 MG/1
5 TABLET ORAL DAILY
Qty: 30 TABLET | Refills: 2 | Status: SHIPPED | OUTPATIENT
Start: 2023-09-11

## 2023-09-11 RX ORDER — ALBUTEROL SULFATE 90 UG/1
2 AEROSOL, METERED RESPIRATORY (INHALATION) EVERY 4 HOURS PRN
Qty: 1 EACH | Refills: 2 | Status: SHIPPED | OUTPATIENT
Start: 2023-09-11

## 2023-09-11 RX ORDER — CODEINE PHOSPHATE AND GUAIFENESIN 10; 100 MG/5ML; MG/5ML
5 SOLUTION ORAL EVERY 6 HOURS PRN
Qty: 140 ML | Refills: 0 | Status: SHIPPED | OUTPATIENT
Start: 2023-09-11

## 2023-09-11 NOTE — TELEPHONE ENCOUNTER
Message noted: Chart reviewed and may change to cheratussin as requested. Script sent to listed pharmacy by secure method. Pt notified through [facility].

## 2023-09-11 NOTE — TELEPHONE ENCOUNTER
Pharmacy is calling to update Dr Qasim Kay that the medication is not available at any [facility].  Please advise as to another medication for the patient     promethazine-codeine 6.25-10 MG/5ML Oral Syrup

## 2023-09-11 NOTE — PROGRESS NOTES
Subjective:   Patient ID: Juan Newsome is a 60year old female. Pt is here to follow up to discuss treatment of diabetes. Pt is eating much better. Last hgba1c was over 12. Pt is here to discuss medication and does not want to take metformin. Pt also has hx of recent hypertension and was given amlodipine in ER which helped. Requesting refill. Pt also has had cough recently. No fevers or wheezing/ SOB. History/Other:   Review of Systems   Constitutional:  Negative for fever. Respiratory:  Positive for cough. Negative for shortness of breath and wheezing. Cardiovascular:  Negative for chest pain and leg swelling. Gastrointestinal:  Negative for abdominal pain. Current Outpatient Medications   Medication Sig Dispense Refill    amLODIPine 5 MG Oral Tab Take 1 tablet (5 mg total) by mouth daily. 30 tablet 2    empagliflozin (JARDIANCE) 10 MG Oral Tab Take 1 tablet (10 mg total) by mouth daily. 30 tablet 2    albuterol 108 (90 Base) MCG/ACT Inhalation Aero Soln Inhale 2 puffs into the lungs every 4 (four) hours as needed for Wheezing or Shortness of Breath. 1 each 2    promethazine-codeine 6.25-10 MG/5ML Oral Syrup Take 1  teaspoons by mouth every 6 hours as needed for cough 180 mL 0    ibuprofen 800 MG Oral Tab Take 1 tablet (800 mg total) by mouth every 8 (eight) hours as needed for Pain. 30 tablet 2     Allergies:No Known Allergies    Objective:   Physical Exam  Constitutional:       Appearance: Normal appearance. Cardiovascular:      Rate and Rhythm: Normal rate and regular rhythm. Pulses: Normal pulses. Heart sounds: Normal heart sounds. Pulmonary:      Effort: Pulmonary effort is normal.      Breath sounds: Normal breath sounds. Neurological:      Mental Status: She is alert. Psychiatric:         Mood and Affect: Mood normal.         Behavior: Behavior normal.         Assessment & Plan:   New onset type 2 diabetes mellitus: reviewed recent lab work.   - After discussion, will start jardiance as directed; discussed benefitis and potential side effects; will send to diabetes education center and endocrine for further education if needed; To call if any significant symptoms. Follow up in 1-2 months to recheck hgba1c. Discussed good diet and activity. Essential hypertension:  - Stable, Will check lab work as below; Medication renewed. Follow up and further management after testing. To monitor blood pressure; To call if any persistent elevation of blood pressure; Discussed good diet/activity; Routine follow up in 6-12 months or as needed. Cough, unspecified type:  - After discussion with patient, codeine cough syrup provided as directed and renewed albuterol MDI; To call if worse or not better; Follow up in one week if not resolved or as needed if worse. Orders Placed This Encounter      Hemoglobin A1C      Comp Metabolic Panel (14)      Meds This Visit:  Requested Prescriptions     Signed Prescriptions Disp Refills    amLODIPine 5 MG Oral Tab 30 tablet 2     Sig: Take 1 tablet (5 mg total) by mouth daily. empagliflozin (JARDIANCE) 10 MG Oral Tab 30 tablet 2     Sig: Take 1 tablet (10 mg total) by mouth daily.     albuterol 108 (90 Base) MCG/ACT Inhalation Aero Soln 1 each 2     Sig: Inhale 2 puffs into the lungs every 4 (four) hours as needed for Wheezing or Shortness of Breath.    promethazine-codeine 6.25-10 MG/5ML Oral Syrup 180 mL 0     Sig: Take 1  teaspoons by mouth every 6 hours as needed for cough       Imaging & Referrals:  ENDOCRINOLOGY - INTERNAL

## 2023-09-11 NOTE — TELEPHONE ENCOUNTER
Gm Jones MD   to [facility]         9/11/23  4:59 PM  Krish VALDEZ,     I got your message. I sent your prescription you requested to your pharmacy. Dr. Kan Rios    Last read by [facility] at  5:43 PM on 9/11/2023.

## 2023-09-11 NOTE — TELEPHONE ENCOUNTER
Dr. Meri Mendoza: Please advise if there is an alternative medication you would like patient to have for cough. [facility] do not have in stock. Patient did not want to call alternative pharmacies and said she will take alternative medication.

## 2023-09-27 ENCOUNTER — TELEPHONE (OUTPATIENT)
Dept: FAMILY MEDICINE CLINIC | Facility: CLINIC | Age: 58
End: 2023-09-27

## 2023-09-27 RX ORDER — BLOOD-GLUCOSE METER
EACH MISCELLANEOUS
Qty: 1 KIT | Refills: 0 | Status: SHIPPED | OUTPATIENT
Start: 2023-09-27

## 2023-09-27 RX ORDER — BLOOD SUGAR DIAGNOSTIC
STRIP MISCELLANEOUS
Qty: 100 EACH | Refills: 3 | Status: SHIPPED | OUTPATIENT
Start: 2023-09-27

## 2023-09-27 NOTE — TELEPHONE ENCOUNTER
Message noted: Chart reviewed and may refill diabetic supplies as requested. Prescription sent to listed pharmacy. Pharmacy to notify patient.    Pt notified through [facility]

## 2023-09-27 NOTE — TELEPHONE ENCOUNTER
Verified name and . Patient was seen in office with Dr. Kan Rios 23 during which she states Dr. Kan Rios advised her to start checking her blood sugars. She is requesting prescription for one touch ultra glucometer and testing strips.     Orders pended for your review and approval.

## 2023-10-20 RX ORDER — BLOOD SUGAR DIAGNOSTIC
STRIP MISCELLANEOUS
Qty: 100 EACH | Refills: 3 | Status: CANCELLED | OUTPATIENT
Start: 2023-10-20

## 2023-10-20 NOTE — TELEPHONE ENCOUNTER
Spoke to patient. She is asking if she can check her blood sugar three times a day. She is trying to get this under control and does not feel like once daily blood glucose checking is giving her much information. She will call pharmacy and ask them to fill the one Touch Ultra 100 strips but would like a TID order moving forward. Dr. Lidia Pineda, please advise if that is ok.

## 2023-10-21 RX ORDER — BLOOD SUGAR DIAGNOSTIC
STRIP MISCELLANEOUS
Qty: 300 EACH | Refills: 1 | Status: SHIPPED | OUTPATIENT
Start: 2023-10-21

## 2023-10-21 NOTE — TELEPHONE ENCOUNTER
New order test strips for testing 3 times per day sent to pharmacy. Left message to call back to go over instructions with patient.

## 2023-10-23 NOTE — TELEPHONE ENCOUNTER
[facility] active, message sent with Dr Valeriy Truong instruction. Disp Refills Start End    Glucose Blood (ONETOUCH ULTRA) In Vitro Strip 300 each 1 10/21/2023     Sig: Check blood glucose 3 times daily. Sent to pharmacy as: OneTouch Ultra In Vitro Strip (Glucose Blood)    Notes to Pharmacy: Diagnosis code: E11.9. Please dispense what patient's insurance will cover.     E-Prescribing Status: Receipt confirmed by pharmacy (10/21/2023  8:49 AM CDT)      Pharmacy    [place] [id number] #[id number] - [address] AT THE [facility] OF [address] [place] [address], [phone], [phone]

## 2023-10-23 NOTE — TELEPHONE ENCOUNTER
Patient has read [facility] on 10/23/23;      test strips  Message [phone]  From  Lizzy Harris RN To  Shavon Christensen and Delivered  10/23/2023 12:19 PM   Last Read in [facility]go  10/23/2023  1:19 PM by Reese Caputo

## 2023-11-01 ENCOUNTER — TELEPHONE (OUTPATIENT)
Dept: FAMILY MEDICINE CLINIC | Facility: CLINIC | Age: 58
End: 2023-11-01

## 2023-11-01 NOTE — TELEPHONE ENCOUNTER
Lvm and sent [employer] message for Patient to call back and schedule DM eye exam and HTN bp follow up.

## 2023-12-07 ENCOUNTER — TELEPHONE (OUTPATIENT)
Dept: FAMILY MEDICINE CLINIC | Facility: CLINIC | Age: 58
End: 2023-12-07

## 2023-12-07 NOTE — TELEPHONE ENCOUNTER
Patient got DM eye exam done at [facility] and will have them faxed results over. . will follow up. I charted Patient's daily bp follow up.

## 2023-12-14 RX ORDER — AMLODIPINE BESYLATE 5 MG/1
5 TABLET ORAL DAILY
Qty: 90 TABLET | Refills: 1 | Status: SHIPPED | OUTPATIENT
Start: 2023-12-14

## 2023-12-14 NOTE — TELEPHONE ENCOUNTER
Please review. Protocol failed / Has no protocol.      Requested Prescriptions   Pending Prescriptions Disp Refills    AMLODIPINE 5 MG Oral Tab [Pharmacy Med Name: AMLODIPINE BESYLATE 5MG TABLETS] 30 tablet 2     Sig: TAKE 1 TABLET(5 MG) BY MOUTH DAILY       Hypertensive Medications Protocol Failed - 12/13/2023 12:50 PM        Failed - Last BP reading less than 140/90     BP Readings from Last 1 Encounters:   09/11/23 149/80               Passed - In person appointment in the past 12 or next 3 months     Recent Outpatient Visits              3 months ago New onset type 2 diabetes mellitus ([facility]    [address] [place] [address], Eb Boyle MD    Office Visit    8 months ago Routine physical examination    Merrill Marcum MD    Office Visit    2 years ago Routine physical examination    Merrill Marcum MD    Office Visit    4 years ago Burning with urination    Merrill Marcum MD    Office Visit    4 years ago Routine physical examination    Merrill Marcum MD    Office Visit                      Passed - CMP or BMP in past 6 months     Recent Results (from the past 4392 hour(s))   Comp Metabolic Panel (14)    Collection Time: 08/23/23  4:08 PM   Result Value Ref Range    Glucose 259 (H) 70 - 99 mg/dL    Sodium 137 136 - 145 mmol/L    Potassium 4.3 3.5 - 5.1 mmol/L    Chloride 101 98 - 112 mmol/L    CO2 31.0 21.0 - 32.0 mmol/L    Anion Gap 5 0 - 18 mmol/L    BUN 8 7 - 18 mg/dL    Creatinine 0.60 0.55 - 1.02 mg/dL    BUN/CREA Ratio 13.3 10.0 - 20.0    Calcium, Total 9.4 8.5 - 10.1 mg/dL    Calculated Osmolality 291 275 - 295 mOsm/kg    eGFR-Cr 103 >=60 mL/min/1.73m2    ALT 17 13 - 56 U/L    AST 12 (L) 15 - 37 U/L    Alkaline Phosphatase 107 46 - 118 U/L Bilirubin, Total 0.5 0.1 - 2.0 mg/dL    Total Protein 7.1 6.4 - 8.2 g/dL    Albumin 4.0 3.4 - 5.0 g/dL    Globulin  3.1 2.8 - 4.4 g/dL    A/G Ratio 1.3 1.0 - 2.0    Patient Fasting for CMP? Yes      *Note: Due to a large number of results and/or encounters for the requested time period, some results have not been displayed. A complete set of results can be found in Results Review.                Passed - In person appointment or virtual visit in the past 6 months     Recent Outpatient Visits              3 months ago New onset type 2 diabetes mellitus ([facility]    Vi Gray MD    Office Visit    8 months ago Routine physical examination    Vi Gray MD    Office Visit    2 years ago Routine physical examination    Vi Gray MD    Office Visit    4 years ago Burning with urination    Vi Gray MD    Office Visit    4 years ago Routine physical examination    Vi Gray MD    Office Visit                      Passed - EGFRCR or GFRAA > 50     GFR Evaluation  EGFRCR: 103 , resulted on 8/23/2023            JARDIANCE 10 MG Oral Tab [Pharmacy Med Name: Javier Sinclair 10MG TABLETS] 30 tablet 2     Sig: TAKE 1 TABLET(10 MG) BY MOUTH DAILY       Diabetes Medication Protocol Failed - 12/13/2023 12:50 PM        Failed - Last A1C < 7.5 and within past 6 months     Lab Results   Component Value Date    A1C 12.1 (H) 08/23/2023             Passed - In person appointment or virtual visit in the past 6 mos or appointment in next 3 mos     Recent Outpatient Visits              3 months ago New onset type 2 diabetes mellitus ([facility]    Vi Gray MD    Office Visit    8 months ago Routine physical examination    Indy Ortega MD    Office Visit    2 years ago Routine physical examination    Indy Ortega MD    Office Visit    4 years ago Burning with urination    Indy Ortega MD    Office Visit    4 years ago Routine physical examination    Indy Ortega MD    Office Visit                      Passed - EGFRCR or GFRAA > 50     GFR Evaluation  EGFRCR: 103 , resulted on 8/23/2023          Passed - GFR in the past 12 months              Recent Outpatient Visits              3 months ago New onset type 2 diabetes mellitus [facility])    Indy Ortega MD    Office Visit    8 months ago Routine physical examination    Indy Ortega MD    Office Visit    2 years ago Routine physical examination    Indy Ortega MD    Office Visit    4 years ago Burning with urination    Indy Ortega MD    Office Visit    4 years ago Routine physical examination    Indy Ortega MD    Office Visit

## 2023-12-14 NOTE — TELEPHONE ENCOUNTER
Message noted: Chart reviewed and may refill medication as requested. Prescription sent to listed pharmacy. Pharmacy to notify patient.  Pt notified through [facility] [place] [facility] [place]

## 2023-12-15 ENCOUNTER — NURSE TRIAGE (OUTPATIENT)
Dept: FAMILY MEDICINE CLINIC | Facility: CLINIC | Age: 58
End: 2023-12-15

## 2023-12-15 ENCOUNTER — APPOINTMENT (OUTPATIENT)
Dept: CT IMAGING | Facility: HOSPITAL | Age: 58
End: 2023-12-15
Attending: EMERGENCY MEDICINE
Payer: COMMERCIAL

## 2023-12-15 ENCOUNTER — HOSPITAL ENCOUNTER (EMERGENCY)
Facility: HOSPITAL | Age: 58
Discharge: HOME | End: 2023-12-15
Attending: EMERGENCY MEDICINE
Payer: COMMERCIAL

## 2023-12-15 VITALS
OXYGEN SATURATION: 98 % | DIASTOLIC BLOOD PRESSURE: 87 MMHG | SYSTOLIC BLOOD PRESSURE: 178 MMHG | HEIGHT: 64 IN | RESPIRATION RATE: 16 BRPM | WEIGHT: 171 LBS | HEART RATE: 97 BPM | BODY MASS INDEX: 29.19 KG/M2 | TEMPERATURE: 98 F

## 2023-12-15 DIAGNOSIS — S39.012A STRAIN OF LUMBAR REGION, INITIAL ENCOUNTER: Primary | ICD-10-CM

## 2023-12-15 LAB
ALBUMIN SERPL-MCNC: 4.5 G/DL (ref 3.2–4.8)
ALBUMIN/GLOB SERPL: 1.7 {RATIO} (ref 1–2)
ALP LIVER SERPL-CCNC: 126 U/L
ALT SERPL-CCNC: 20 U/L
ANION GAP SERPL CALC-SCNC: 6 MMOL/L (ref 0–18)
AST SERPL-CCNC: 20 U/L (ref ?–34)
BASOPHILS # BLD AUTO: 0.08 X10(3) UL (ref 0–0.2)
BASOPHILS NFR BLD AUTO: 1 %
BILIRUB SERPL-MCNC: 0.4 MG/DL (ref 0.3–1.2)
BILIRUB UR QL: NEGATIVE
BUN BLD-MCNC: 10 MG/DL (ref 9–23)
BUN/CREAT SERPL: 13.7 (ref 10–20)
CALCIUM BLD-MCNC: 9.3 MG/DL (ref 8.7–10.4)
CHLORIDE SERPL-SCNC: 102 MMOL/L (ref 98–112)
CLARITY UR: CLEAR
CO2 SERPL-SCNC: 27 MMOL/L (ref 21–32)
CREAT BLD-MCNC: 0.73 MG/DL
DEPRECATED RDW RBC AUTO: 43.7 FL (ref 35.1–46.3)
EGFRCR SERPLBLD CKD-EPI 2021: 95 ML/MIN/1.73M2 (ref 60–?)
EOSINOPHIL # BLD AUTO: 0.23 X10(3) UL (ref 0–0.7)
EOSINOPHIL NFR BLD AUTO: 2.9 %
ERYTHROCYTE [DISTWIDTH] IN BLOOD BY AUTOMATED COUNT: 13.3 % (ref 11–15)
GLOBULIN PLAS-MCNC: 2.7 G/DL (ref 2.8–4.4)
GLUCOSE BLD-MCNC: 325 MG/DL (ref 70–99)
GLUCOSE UR-MCNC: >1000 MG/DL
HCT VFR BLD AUTO: 44 %
HGB BLD-MCNC: 14.3 G/DL
HGB UR QL STRIP.AUTO: NEGATIVE
IMM GRANULOCYTES # BLD AUTO: 0.03 X10(3) UL (ref 0–1)
IMM GRANULOCYTES NFR BLD: 0.4 %
KETONES UR-MCNC: 80 MG/DL
LEUKOCYTE ESTERASE UR QL STRIP.AUTO: NEGATIVE
LYMPHOCYTES # BLD AUTO: 1.92 X10(3) UL (ref 1–4)
LYMPHOCYTES NFR BLD AUTO: 24.6 %
MCH RBC QN AUTO: 28.9 PG (ref 26–34)
MCHC RBC AUTO-ENTMCNC: 32.5 G/DL (ref 31–37)
MCV RBC AUTO: 89.1 FL
MONOCYTES # BLD AUTO: 0.92 X10(3) UL (ref 0.1–1)
MONOCYTES NFR BLD AUTO: 11.8 %
NEUTROPHILS # BLD AUTO: 4.63 X10 (3) UL (ref 1.5–7.7)
NEUTROPHILS # BLD AUTO: 4.63 X10(3) UL (ref 1.5–7.7)
NEUTROPHILS NFR BLD AUTO: 59.3 %
NITRITE UR QL STRIP.AUTO: NEGATIVE
OSMOLALITY SERPL CALC.SUM OF ELEC: 292 MOSM/KG (ref 275–295)
PH UR: 5 [PH] (ref 5–8)
PLATELET # BLD AUTO: 207 10(3)UL (ref 150–450)
POTASSIUM SERPL-SCNC: 4 MMOL/L (ref 3.5–5.1)
PROT SERPL-MCNC: 7.2 G/DL (ref 5.7–8.2)
PROT UR-MCNC: NEGATIVE MG/DL
RBC # BLD AUTO: 4.94 X10(6)UL
SODIUM SERPL-SCNC: 135 MMOL/L (ref 136–145)
SP GR UR STRIP: >1.03 (ref 1–1.03)
UROBILINOGEN UR STRIP-ACNC: NORMAL
WBC # BLD AUTO: 7.8 X10(3) UL (ref 4–11)

## 2023-12-15 PROCEDURE — 85025 COMPLETE CBC W/AUTO DIFF WBC: CPT | Performed by: EMERGENCY MEDICINE

## 2023-12-15 PROCEDURE — 99284 EMERGENCY DEPT VISIT MOD MDM: CPT

## 2023-12-15 PROCEDURE — 81003 URINALYSIS AUTO W/O SCOPE: CPT | Performed by: EMERGENCY MEDICINE

## 2023-12-15 PROCEDURE — 74176 CT ABD & PELVIS W/O CONTRAST: CPT | Performed by: EMERGENCY MEDICINE

## 2023-12-15 PROCEDURE — 80053 COMPREHEN METABOLIC PANEL: CPT | Performed by: EMERGENCY MEDICINE

## 2023-12-15 PROCEDURE — 36415 COLL VENOUS BLD VENIPUNCTURE: CPT

## 2023-12-15 RX ORDER — LIDOCAINE 50 MG/G
1 PATCH TOPICAL EVERY 24 HOURS
Qty: 7 PATCH | Refills: 0 | Status: SHIPPED | OUTPATIENT
Start: 2023-12-15

## 2023-12-15 RX ORDER — CYCLOBENZAPRINE HCL 10 MG
10 TABLET ORAL 3 TIMES DAILY PRN
Qty: 20 TABLET | Refills: 0 | Status: SHIPPED | OUTPATIENT
Start: 2023-12-15 | End: 2023-12-22

## 2023-12-15 RX ORDER — CYCLOBENZAPRINE HCL 5 MG
10 TABLET ORAL ONCE
Status: COMPLETED | OUTPATIENT
Start: 2023-12-15 | End: 2023-12-15

## 2023-12-15 NOTE — ED INITIAL ASSESSMENT (HPI)
L side flank pain wrapping around to the front for two days ago. Denies urinary sx, n/v/d, or fevers. Hx of kidney stones but unsure if that is what is wrong.

## 2023-12-15 NOTE — TELEPHONE ENCOUNTER
Action Requested: Summary for Provider     []  Critical Lab, Recommendations Needed  [] Need Additional Advice  [x]   FYI    []   Need Orders  [] Need Medications Sent to Pharmacy  []  Other     SUMMARY: RN offered appointment, but patient desires imaging and blood work - would like to go to ER. Reason for call: Back Pain - Left upper back pain . Sharp pain at times  Onset: 2 days ago    Patient called office. Date of birth and full name both confirmed. Denies shortness of breath, difficulty breathing, chest pain, chest pressure. Asking if this is her kidney. But also used 5 lb weights recently for exercise. Triaged per protocol and advised care advice per protocol. More details regarding Symptoms under Additional Documentation > Protocols Used. Evaluation advised today, offered visit. Pt agreeable at first, but also desires blood tests and imaging to see if kidney is okay. Patient opts to go to Emergency Room. Care Advice             Patient/Caregiver understands and will follow care advice?: Yes, with modifications                        SEE IN OFFICE TODAY OR TOMORROW:   * You need to be examined. Let me give you an appointment.     Go to ER or Immediate Care if:    * Numbness or weakness occurs, or bowel/bladder problems  * Pain becomes worse  * You become worse                       Reason for Disposition   Age > 50 and no history of prior similar back pain    Protocols used: Back Pain-A-[place]

## 2024-01-26 ENCOUNTER — TELEPHONE (OUTPATIENT)
Dept: FAMILY MEDICINE CLINIC | Facility: CLINIC | Age: 59
End: 2024-01-26

## 2024-01-26 DIAGNOSIS — H53.8 BLURRED VISION, BILATERAL: Primary | ICD-10-CM

## 2024-01-26 DIAGNOSIS — E11.9 TYPE 2 DIABETES MELLITUS WITHOUT COMPLICATION, WITHOUT LONG-TERM CURRENT USE OF INSULIN (HCC): ICD-10-CM

## 2024-01-26 NOTE — TELEPHONE ENCOUNTER
Pt states she went to an optometrist who advised her to see an ophthalmologist due to pt having double vision and pt did see Dr Herman.  Per pt ophthalmologist stated due to diabetes pt may have nerve damage and advised MRI.  Pt did not care for the ophthalmologist she saw and did not have MRI done.    Pt is asking if Dr Becerril thinks she needs to follow up with opthalmology and if so can he refer her to one in the [facility] network.  Please advise    If ophthalmology is advised referral is pended.

## 2024-01-27 NOTE — TELEPHONE ENCOUNTER
Referral request noted. Referral approved, signed and sent to [facility]. Can see Dr Krishnan or Dr Petersen/ Vivek who also work out of [facility].

## 2024-01-31 ENCOUNTER — OFFICE VISIT (OUTPATIENT)
Dept: OPHTHALMOLOGY | Facility: CLINIC | Age: 59
End: 2024-01-31

## 2024-01-31 DIAGNOSIS — H53.2 DIPLOPIA: ICD-10-CM

## 2024-01-31 DIAGNOSIS — H25.13 AGE-RELATED NUCLEAR CATARACT OF BOTH EYES: ICD-10-CM

## 2024-01-31 DIAGNOSIS — E11.9 TYPE 2 DIABETES MELLITUS WITHOUT RETINOPATHY (HCC): Primary | ICD-10-CM

## 2024-01-31 DIAGNOSIS — H49.22 6TH NERVE PALSY, LEFT: ICD-10-CM

## 2024-01-31 PROCEDURE — 92004 COMPRE OPH EXAM NEW PT 1/>: CPT | Performed by: OPHTHALMOLOGY

## 2024-01-31 PROCEDURE — 3072F LOW RISK FOR RETINOPATHY: CPT | Performed by: OPHTHALMOLOGY

## 2024-01-31 PROCEDURE — 2023F DILAT RTA XM W/O RTNOPTHY: CPT | Performed by: OPHTHALMOLOGY

## 2024-01-31 NOTE — PROGRESS NOTES
Terry Jaimes is a 59 year old female.    HPI:     HPI    New patient here for a diabetic exam per Dr. Becerril.  She states she woke up on 1/23/24 with horizontal double vision.  Patient also notices her left eye is turning in towards her nose since 1/26/24.  She also complains of headaches and pain behind her left eye.  Patient was seen at [facility] on 1/23/24 she has plano glasses on order with prism ground in [place].  Patient was seen by Dr. Marquez @ [facility] 01/24/24. He recommended and MRI, but patient is here for a second opinion.  Patient wears soft contact lenses.      Pt has been a diabetic for 4 months       Pt's diabetes is currently controlled by pills  Pt checks BS 4 times a day   Pt's last blood sugar was  136 this morning  Last HA1C was 6.9 on on 1/30/24  Endocrinologist: Dr. Efrain Galeano (pt has an appt on 2/05/24)    Last edited by Yesika Santamaria OT on 1/31/2024  1:34 PM.        Patient History:  Past Medical History:   Diagnosis Date    Breast hypertrophy 2010    reduction mammoplasty 10/2010    Hypertension     Lipid screening 4/17/2014    per NG    Mild intermittent asthma     drug therapy    Nephrolithiasis 1995    lithotripsy       Surgical History: Terry Jaimes has a past surgical history that includes lithotripsy (01/01/1995); reduction of large breast (01/01/2010); reduction left (2010); and reduction right (2010).    Family History   Problem Relation Age of Onset    Macular degeneration Mother     Diabetes Mother 68    Hypertension Other         family h/o    Glaucoma Neg        Social History:   Social History     Socioeconomic History    Marital status:    Tobacco Use    Smoking status: Never    Smokeless tobacco: Never   Vaping Use    Vaping Use: Never used   Substance and Sexual Activity    Alcohol use: No    Drug use: Never   Other Topics Concern    Caffeine Concern Yes     Comment: soda-16 oz./day       Medications:  Current Outpatient Medications    Medication Sig Dispense Refill    lidocaine 5 % External Patch Place 1 patch onto the skin daily. 7 patch 0    amLODIPine 5 MG Oral Tab Take 1 tablet (5 mg total) by mouth daily. 90 tablet 1    empagliflozin (JARDIANCE) 10 MG Oral Tab Take 1 tablet (10 mg total) by mouth daily. 90 tablet 1    Glucose Blood (ONETOUCH ULTRA) In Vitro Strip Check blood glucose 3 times daily. 300 each 1    Blood Glucose Monitoring Suppl (ONETOUCH ULTRA 2) w/Device Does not apply Kit Use to check blood sugar once daily. 1 kit 0    albuterol 108 (90 Base) MCG/ACT Inhalation Aero Soln Inhale 2 puffs into the lungs every 4 (four) hours as needed for Wheezing or Shortness of Breath. 1 each 2    promethazine-codeine 6.25-10 MG/5ML Oral Syrup Take 1  teaspoons by mouth every 6 hours as needed for cough 180 mL 0    guaiFENesin-codeine (CHERATUSSIN AC) 100-10 MG/5ML Oral Solution Take 5 mL by mouth every 6 (six) hours as needed for cough. Medication may causes sedation. Not to operate heavy machinery or drive on medication. 140 mL 0    ibuprofen 800 MG Oral Tab Take 1 tablet (800 mg total) by mouth every 8 (eight) hours as needed for Pain. 30 tablet 2       Allergies:  No Known Allergies    ROS:     ROS    Positive for: Endocrine, Eyes  Negative for: Constitutional, Gastrointestinal, Neurological, Skin, Genitourinary, Musculoskeletal, HENT, Cardiovascular, Respiratory, Psychiatric, Allergic/Imm, Heme/Lymph  Last edited by Yesika Santamaria OT on 1/31/2024  1:16 PM.          PHYSICAL EXAM:     Base Eye Exam       Visual Acuity (Snellen - Linear)         Right Left    Dist cc 20/20 20/20    Near cc 20/20 20/20      Correction: Contacts              Tonometry (Icare, 1:28 PM)         Right Left    Pressure 20 20              Pupils         Pupils    Right PERRL    Left PERRL              Visual Fields         Left Right     Full Full              Extraocular Movement    OD ortho  OS ET             Dilation       Both eyes: 1.0% Mydriacyl and 2.5%  Jesse Synephrine @ 1:28 PM              Dilation #2       Both eyes: 1.0% Mydriacyl and 2.5% Jesse Synephrine @ 1:28 PM                  Slit Lamp and Fundus Exam       Slit Lamp Exam         Right Left    Lids/Lashes Dermatochalasis, Meibomian gland dysfunction Dermatochalasis, Meibomian gland dysfunction    Conjunctiva/Sclera Nasal/temp pinguecula Temp pinguecula, Ocular Melanosis nasally    Cornea 2+ Arcus 2+ Arcus    Anterior Chamber Deep and quiet Deep and quiet    Iris Normal Normal    Lens 2+ Nuclear sclerosis 2+ Nuclear sclerosis    Vitreous Clear Clear              Fundus Exam         Right Left    Disc Sloping margin, Temporal crescent, Tilted disc Sloping margin, Temporal crescent, Tilted disc    C/D Ratio 0.85 0.85    Macula Normal-no BDR Normal-no BDR    Vessels Normal Normal    Periphery Normal Normal                  Refraction       Wearing Rx         Sphere Cylinder    Right +1.75 Sphere    Left +1.75 Sphere      Type: OTC reading only              Manifest Refraction (Auto)         Sphere Cylinder Axis    Right -8.00 +1.25 005    Left -6.50 Sphere    Patient is happy with her contacts and OTC reading glasses                    ASSESSMENT/PLAN:     Diagnoses and Plan:     Type 2 diabetes mellitus without retinopathy (HCC)  Diabetes type II: no background of retinopathy, no signs of neovascularization noted.  Discussed ocular and systemic benefits of blood sugar control.  Diagnosis and treatment discussed in detail with patient.    Will see patient in 1 year for a diabetic exam    Age-related nuclear cataract of both eyes  Discussed mild cataracts with patient.  No treatment recommended at this time.       6th nerve palsy, left  Diagnosis discussed with patient.  Recommend evaluation with Dr. Sales    Diplopia  Will see Dr. Sales on 2/02/24    No orders of the defined types were placed in this encounter.      Meds This Visit:  Requested Prescriptions      No prescriptions requested or ordered in this  encounter        Follow up instructions:  Return in about 2 days (around 2/2/2024) for Diplopia evaluation then 1 year for a diabetic exam.    1/31/2024  Scribed by: Orion Krishnan MD

## 2024-01-31 NOTE — PATIENT INSTRUCTIONS
Type 2 diabetes mellitus without retinopathy (HCC)  Diabetes type II: no background of retinopathy, no signs of neovascularization noted.  Discussed ocular and systemic benefits of blood sugar control.  Diagnosis and treatment discussed in detail with patient.    Will see patient in 1 year for a diabetic exam    Age-related nuclear cataract of both eyes  Discussed mild cataracts with patient.  No treatment recommended at this time.       6th nerve palsy, left  Diagnosis discussed with patient.  Recommend evaluation with Dr. Sales    Diplopia  Will see Dr. Sales on 2/02/24

## 2024-02-02 ENCOUNTER — OFFICE VISIT (OUTPATIENT)
Dept: OPHTHALMOLOGY | Facility: CLINIC | Age: 59
End: 2024-02-02

## 2024-02-02 DIAGNOSIS — H25.13 AGE-RELATED NUCLEAR CATARACT OF BOTH EYES: ICD-10-CM

## 2024-02-02 DIAGNOSIS — E11.9 TYPE 2 DIABETES MELLITUS WITHOUT RETINOPATHY (HCC): ICD-10-CM

## 2024-02-02 DIAGNOSIS — H49.22 6TH NERVE PALSY, LEFT: Primary | ICD-10-CM

## 2024-02-02 DIAGNOSIS — H53.2 DIPLOPIA: ICD-10-CM

## 2024-02-02 DIAGNOSIS — H52.203 MYOPIA OF BOTH EYES WITH ASTIGMATISM: ICD-10-CM

## 2024-02-02 DIAGNOSIS — H52.13 MYOPIA OF BOTH EYES WITH ASTIGMATISM: ICD-10-CM

## 2024-02-02 PROCEDURE — 92014 COMPRE OPH EXAM EST PT 1/>: CPT | Performed by: OPHTHALMOLOGY

## 2024-02-02 PROCEDURE — 92060 SENSORIMOTOR EXAMINATION: CPT | Performed by: OPHTHALMOLOGY

## 2024-02-04 PROBLEM — H52.203 MYOPIA OF BOTH EYES WITH ASTIGMATISM: Status: ACTIVE | Noted: 2024-02-04

## 2024-02-04 PROBLEM — H52.13 MYOPIA OF BOTH EYES WITH ASTIGMATISM: Status: ACTIVE | Noted: 2024-02-04

## 2024-02-04 NOTE — ASSESSMENT & PLAN NOTE
Left CN palsy probably vascular secondary to Diabetes and Hypertension. However, due to recent onset of Diabetes, recommend MRI brain and orbits. MRI was Ordered.  To alleviate diplopia, 15 PD Fresnel prism placed on Left lens base out on prism glasses with 12 Base Out ground in, split 6 and 6.  Therefore a total of 27 Base Out prism to see single at distance in primary position.

## 2024-02-04 NOTE — PATIENT INSTRUCTIONS
6th nerve palsy, left  Left CN palsy probably vascular secondary to Diabetes and Hypertension. However, due to recent onset of Diabetes, recommend MRI brain and orbits. MRI was Ordered.  To alleviate diplopia, 15 PD Fresnel prism placed on Left lens base out on prism glasses with 12 Base Out ground in, split 6 and 6.  Therefore a total of 27 Base Out prism to see single at distance in primary position.    Diplopia  Horizontal diplopia secondary to Left CN6th palsy.  15 LEODAN Fresnel prism applied to Left lens of prism glasses that have 12 LEODAN split 6 and 6. Sees single in the distance    Type 2 diabetes mellitus without retinopathy (HCC)  Approximately 6 month history of Diabetes diagnosed. No retinopathy on dilated exam with Dr. Krishnan on 1/31/24.    Age-related nuclear cataract of both eyes  Mild, no treatment.    Myopia of both eyes with astigmatism  Same glasses and contacts.

## 2024-02-04 NOTE — PROGRESS NOTES
Terry Jaimes is a 59 year old female.    HPI:     HPI    EP/59 year old here for a diplopia evaluation per Dr. Krishnan.  History of Diabetes for 6 months or longer. Also Hypertension recently on meds. Under the care of Dr. Becerril. To see an Endocrinologist on 2/4/24. LDE was 1/31/24 with Dr. Krishnan.  Diagnoses that day were diplopia, 6th nerve palsy OS, age related cataract OU and type 2 diabetes mellitus no retinopathy.   HPI: She states she woke up on 1/23/24 with horizontal double vision worse in left gaze. Left eye is turning in towards her nose.   She also complains of headaches and pain behind her left eye.  Patient was seen at [facility] on 1/23/24 she has new ground in prism from [facility].  She feels the double vision is improved with the glasses but she still has double vision.   Her last A1C 6.9 on on 1/30/24.  Horizontal double vision worse on left gaze. When looking to the right she sees single.    Last edited by Ana Sales MD on 2/4/2024  5:18 PM.        Patient History:  Past Medical History:   Diagnosis Date    Breast hypertrophy 2010    reduction mammoplasty 10/2010    Hypertension     Lipid screening 4/17/2014    per NG    Mild intermittent asthma     drug therapy    Nephrolithiasis 1995    lithotripsy       Surgical History: Terry Jaimes has a past surgical history that includes lithotripsy (01/01/1995); reduction of large breast (01/01/2010); reduction left (2010); and reduction right (2010).    Family History   Problem Relation Age of Onset    Macular degeneration Mother     Diabetes Mother 68    Hypertension Other         family h/o    Glaucoma Neg        Social History:   Social History     Socioeconomic History    Marital status:    Tobacco Use    Smoking status: Never    Smokeless tobacco: Never   Vaping Use    Vaping Use: Never used   Substance and Sexual Activity    Alcohol use: No    Drug use: Never   Other Topics Concern    Caffeine Concern Yes      Comment: soda-16 oz./day       Medications:  Current Outpatient Medications   Medication Sig Dispense Refill    lidocaine 5 % External Patch Place 1 patch onto the skin daily. 7 patch 0    amLODIPine 5 MG Oral Tab Take 1 tablet (5 mg total) by mouth daily. 90 tablet 1    empagliflozin (JARDIANCE) 10 MG Oral Tab Take 1 tablet (10 mg total) by mouth daily. 90 tablet 1    Glucose Blood (ONETOUCH ULTRA) In Vitro Strip Check blood glucose 3 times daily. 300 each 1    Blood Glucose Monitoring Suppl (ONETOUCH ULTRA 2) w/Device Does not apply Kit Use to check blood sugar once daily. 1 kit 0    albuterol 108 (90 Base) MCG/ACT Inhalation Aero Soln Inhale 2 puffs into the lungs every 4 (four) hours as needed for Wheezing or Shortness of Breath. 1 each 2    promethazine-codeine 6.25-10 MG/5ML Oral Syrup Take 1  teaspoons by mouth every 6 hours as needed for cough 180 mL 0    guaiFENesin-codeine (CHERATUSSIN AC) 100-10 MG/5ML Oral Solution Take 5 mL by mouth every 6 (six) hours as needed for cough. Medication may causes sedation. Not to operate heavy machinery or drive on medication. 140 mL 0    ibuprofen 800 MG Oral Tab Take 1 tablet (800 mg total) by mouth every 8 (eight) hours as needed for Pain. 30 tablet 2       Allergies:  No Known Allergies    ROS:       PHYSICAL EXAM:     Base Eye Exam       Visual Acuity (Snellen - Linear)         Right Left    Dist cc 20/20 20/20      Correction: Contacts              Tonometry (Applanation, 9:08 AM)         Right Left    Pressure 16 16              Pupils         Pupils APD    Right PERRL None    Left PERRL None              Visual Fields (Counting fingers)         Left Right     Full Full                  Strabismus Exam       Method: Cover-uncover      Distance Near Near +3DS N Bifocals    LET 35                  - - - - - -   - - - - - -                      LET 14 - -  - -  LET 35 - -  - -  LET >40                     - - - - - -   - - - - - -                EOM's: LE cannot  abduct left eye past mid line, good elevation, good depression, marked limitation of abduction of left eye , slight head turn left  RE Full ductions     Patient likes 15BO over prism glasses which have 12 LEODAN total. Sees single at distance in primary position. Therefore a total of 27PD Base out prism to see single. Patient has slight head turn to left.       Slit Lamp and Fundus Exam       External Exam         Right Left    External Normal Normal              Slit Lamp Exam         Right Left    Lids/Lashes Dermatochalasis, Meibomian gland dysfunction Dermatochalasis, Meibomian gland dysfunction    Conjunctiva/Sclera Nasal/temp pinguecula Temp pinguecula, Ocular Melanosis nasally    Cornea 2+ Arcus 2+ Arcus    Anterior Chamber Deep and quiet Deep and quiet    Iris Normal Normal    Lens 1-2+ Nuclear sclerosis 1-2+ Nuclear sclerosis    Vitreous Clear Clear              Fundus Exam         Right Left    Disc Sloping margin, Temporal crescent, Tilted disc Sloping margin, Temporal crescent, Tilted disc    C/D Ratio 0.6 0.6    Macula Normal-no BDR Normal-no BDR    Vessels Normal Normal                  Refraction       Wearing Rx         Sphere Cylinder Horz Prism    Right [place] Sphere 6.0 out    Left [place] Sphere 6.0 out              Manifest Refraction (Auto)         Sphere Cylinder Axis    Right -8.00 +1.25 005    Left -6.50 Sphere                      ASSESSMENT/PLAN:     Diagnoses and Plan:     6th nerve palsy, left  Left CN palsy probably vascular secondary to Diabetes and Hypertension. However, due to recent onset of Diabetes, recommend MRI brain and orbits. MRI was Ordered.  To alleviate diplopia, 15 PD Fresnel prism placed on Left lens base out on prism glasses with 12 Base Out ground in, split 6 and 6.  Therefore a total of 27 Base Out prism to see single at distance in primary position.    Diplopia  Horizontal diplopia secondary to Left CN6th palsy.  15 LEODAN Fresnel prism applied to Left lens of prism glasses  that have 12 LEODAN split 6 and 6. Sees single in the distance    Type 2 diabetes mellitus without retinopathy (HCC)  Approximately 6 month history of Diabetes diagnosed. No retinopathy on dilated exam with Dr. Krishnan on 1/31/24.    Age-related nuclear cataract of both eyes  Mild, no treatment.    Myopia of both eyes with astigmatism  Same glasses and contacts.    No orders of the defined types were placed in this encounter.      Meds This Visit:  Requested Prescriptions      No prescriptions requested or ordered in this encounter        Follow up instructions:  Return in about 6 weeks (around 3/15/2024) for recheck prism.    2/4/2024  Scribed by: Ana Sales MD

## 2024-02-04 NOTE — ASSESSMENT & PLAN NOTE
Approximately 6 month history of Diabetes diagnosed. No retinopathy on dilated exam with Dr. Krishnan on 1/31/24.

## 2024-02-04 NOTE — ASSESSMENT & PLAN NOTE
Horizontal diplopia secondary to Left CN6th palsy.  15 LEODAN Fresnel prism applied to Left lens of prism glasses that have 12 LEODAN split 6 and 6. Sees single in the distance

## 2024-02-05 ENCOUNTER — OFFICE VISIT (OUTPATIENT)
Dept: ENDOCRINOLOGY CLINIC | Facility: CLINIC | Age: 59
End: 2024-02-05
Payer: COMMERCIAL

## 2024-02-05 VITALS
SYSTOLIC BLOOD PRESSURE: 145 MMHG | HEIGHT: 64 IN | HEART RATE: 102 BPM | WEIGHT: 165 LBS | DIASTOLIC BLOOD PRESSURE: 80 MMHG | BODY MASS INDEX: 28.17 KG/M2

## 2024-02-05 DIAGNOSIS — E11.65 UNCONTROLLED TYPE 2 DIABETES MELLITUS WITH HYPERGLYCEMIA (HCC): ICD-10-CM

## 2024-02-05 DIAGNOSIS — R73.09 HIGH GLUCOSE LEVEL: ICD-10-CM

## 2024-02-05 DIAGNOSIS — I10 PRIMARY HYPERTENSION: ICD-10-CM

## 2024-02-05 DIAGNOSIS — E11.9 TYPE 2 DIABETES MELLITUS WITHOUT RETINOPATHY (HCC): Primary | ICD-10-CM

## 2024-02-05 DIAGNOSIS — E11.65 HYPERGLYCEMIA DUE TO DIABETES MELLITUS (HCC): ICD-10-CM

## 2024-02-05 DIAGNOSIS — I10 ESSENTIAL HYPERTENSION: ICD-10-CM

## 2024-02-05 LAB
CARTRIDGE EXPIRATION DATE: ABNORMAL DATE
CARTRIDGE LOT#: ABNORMAL NUMERIC
GLUCOSE BLOOD: 170
HEMOGLOBIN A1C: 8.3 % (ref 4.3–5.6)
TEST STRIP EXPIRATION DATE: NORMAL DATE
TEST STRIP LOT #: NORMAL NUMERIC

## 2024-02-05 RX ORDER — METFORMIN HYDROCHLORIDE 500 MG/1
1000 TABLET, EXTENDED RELEASE ORAL 2 TIMES DAILY WITH MEALS
Qty: 360 TABLET | Refills: 1 | Status: CANCELLED | OUTPATIENT
Start: 2024-02-05 | End: 2024-08-03

## 2024-02-05 RX ORDER — EMPAGLIFLOZIN 25 MG/1
1 TABLET, FILM COATED ORAL DAILY
Qty: 90 TABLET | Refills: 1 | Status: SHIPPED | OUTPATIENT
Start: 2024-02-05 | End: 2024-05-05

## 2024-02-05 RX ORDER — SEMAGLUTIDE 0.68 MG/ML
0.5 INJECTION, SOLUTION SUBCUTANEOUS WEEKLY
Qty: 9 ML | Refills: 1 | Status: CANCELLED
Start: 2024-02-05 | End: 2024-08-03

## 2024-02-05 NOTE — PROGRESS NOTES
New Patient Evaluation - History and Physical    CONSULT - Reason for Visit:  uncontrolled DM .  Requesting Physician:   .Lucy Becerril MD      CHIEF COMPLAINT:    Chief Complaint   Patient presents with    Consult     Diabetes         HISTORY OF PRESENT ILLNESS:   Terry Jaimes is a 59 year old female who presents with uncontrolled DM, hyperglycemia, 6th nerve palsy, HTN, overWt       Lab Results   Component Value Date    A1C 8.3 (A) 2024    A1C 12.1 (H) 2023    A1C 8.3 (H) 2015    A1C 8.6 (H) 2014        She was at the chiropractor 2023 and was told BP is high so had w/u by PCP.   W/u showed A1c 12 so she made lifestyle changes-She stopped soda  Lost 25 lbs since sept   She takes natural meds /supplement   Her target is stop meds     BG at home a bedtime 129 -->  [id number]   2024 A1c 8.3%,      She does not want metformin d/t what she heard of side effect. She was give Rx many yrs ago but did not take it. Mother was on     She is on Jardiance 10  Not on cholesterol lowering meds  Not on ACEi or ARBs        DM quality measures:  A1C/Blood pressure: as reported above   Nephropathyscreening: will order it  - not on ace /arb rx.   LIPID screenin2023 .  Not on statin rx.   Last dilated eye exam: Last Dilated Eye Exam: 24     Exam shows retinopathy? No   Last diabetic foot exam: No data recorded  Dentist : recommend every 6m  Neuropathy: no  CAD/ASCVD/PAD/CVA: no      ASSESSMENT AND PLAN:        CDE Consult - will benefit from more teaching . She already lost 25 lbs and made lifestyle changes. She brought A1c from 12 to 8%.     Target is 6.5% without hypoglycemia   Target BG   BEFORE MEAL 100-130mg/dl  2hrs AFTER MEAL less than 180mg/dl    Fasting AM labs   I will increase jardiance 10 to 25   Walk 180 minutes a week   No sugary drinks     Podiatry consult   Ophtho follow up     She does not want CGM samples,   She does not want statin  She does not want  lisinopril  She deos not want mounjaro, ozempic or trulicity   She deos not want  metformin     She deos not want more meds   Will give printout to read   We discussed complications from uncontrolled diabetes and indication to start meds for preventions, statins, lisinoril,GLP-1..       GLP-1 agonists:  No personal or family history of MEN syndrome   No personal history pancreatitis   Patient counselled regarding side effects including injection site reactions, nausea, vomiting, diarrhea, pancreatitis, gastroparesis and rare side effect roberto French syndrome.    SGLT2 inhibitors  No UTI or yeast infection   Discussed side effects including UTI and fungal infections.   Discussed the importance of hydration.    If you have low blood sugar <70, take 15 grams of carb (8 oz juice or regular soda) and recheck in 15 minutes.    Follow up with podiatry and eye doctor annually.   Patients need to wear covered shoes all the time and check feet daily.   Bring your meter/BG log to the next visit.     We discussed these in detail with the patient and negotiated which of numerous possible changes in the in the treatment plan that would be acceptable to them. The patient remains at ongoing is at high risk for complications related to uncontrolled diabetes and treatment.  The patient requires a great deal of self-management and support. We expect the patient's risk to be reduced with the changes to the treatment plan that we recommended today.                      PAST MEDICAL HISTORY:   Past Medical History:   Diagnosis Date    Breast hypertrophy 2010    reduction mammoplasty 10/2010    Hypertension     Lipid screening 4/17/2014    per NG    Mild intermittent asthma     drug therapy    Nephrolithiasis 1995    lithotripsy       PAST SURGICAL HISTORY:   Past Surgical History:   Procedure Laterality Date    LITHOTRIPSY  01/01/1995    REDUCTION LEFT      2010    REDUCTION OF LARGE BREAST  01/01/2010    REDUCTION RIGHT      2010        CURRENT MEDICATIONS:    Current Outpatient Medications   Medication Sig Dispense Refill    Empagliflozin (JARDIANCE) 25 MG Oral Tab Take 1 each by mouth daily. 90 tablet 1    lidocaine 5 % External Patch Place 1 patch onto the skin daily. 7 patch 0    amLODIPine 5 MG Oral Tab Take 1 tablet (5 mg total) by mouth daily. 90 tablet 1    Glucose Blood (ONETOUCH ULTRA) In Vitro Strip Check blood glucose 3 times daily. 300 each 1    Blood Glucose Monitoring Suppl (ONETOUCH ULTRA 2) w/Device Does not apply Kit Use to check blood sugar once daily. 1 kit 0    albuterol 108 (90 Base) MCG/ACT Inhalation Aero Soln Inhale 2 puffs into the lungs every 4 (four) hours as needed for Wheezing or Shortness of Breath. 1 each 2    promethazine-codeine 6.25-10 MG/5ML Oral Syrup Take 1  teaspoons by mouth every 6 hours as needed for cough 180 mL 0    guaiFENesin-codeine (CHERATUSSIN AC) 100-10 MG/5ML Oral Solution Take 5 mL by mouth every 6 (six) hours as needed for cough. Medication may causes sedation. Not to operate heavy machinery or drive on medication. 140 mL 0    ibuprofen 800 MG Oral Tab Take 1 tablet (800 mg total) by mouth every 8 (eight) hours as needed for Pain. 30 tablet 2       ALLERGIES:  No Known Allergies    SOCIAL HISTORY:    Social History     Socioeconomic History    Marital status:    Tobacco Use    Smoking status: Never    Smokeless tobacco: Never   Vaping Use    Vaping Use: Never used   Substance and Sexual Activity    Alcohol use: No    Drug use: Never   Other Topics Concern    Caffeine Concern Yes     Comment: soda-16 oz./day       FAMILY HISTORY:   Family History   Problem Relation Age of Onset    Macular degeneration Mother     Diabetes Mother 68    Hypertension Other         family h/o    Glaucoma Neg           REVIEW OF SYSTEMS:  All negative other than HPI      PHYSICAL EXAM:   Height: 5' 4\" (162.6 cm) (02/05 1441)  Weight: 165 lb (74.8 kg) (02/05 1441)  BSA (Calculated - sq m): 1.8 sq meters  (02/05 1441)  Pulse: 102 (02/05 1441)  BP: 147/80 (02/05 1441)  Temp: --  Do Not Use - Resp Rate: --  SpO2: --    Body mass index is 28.32 kg/m².    Obese   Diplopia   No tremors   No goiter   No spine tenderness   CONSTITUTIONAL:  Awake and alert. Age appropriate, good hygiene not in acute distress. Well nourished and well developed. no acute distress   PSYCH:   Orientated to time, place, person & situation, Normal mood and affect, memory intact, normal insight and judgment, cooperative  Neuro: speech is clear. Awake, alert, no aphasia, no facial asymmetry, no nuchal rigidity  EYES:  No proptosis, no ptosis, conjunctiva normal  ENT:  Normocephalic, atraumatic  Eye: EOMI, normal lids, no discharge, no conjunctival erythema. No exophthalmos/proptosis, Ptosis negative   No rhinorrhea, moist oral mucosa  Neck: full range of motion  Neck/Thyroid: neck inspection: normal, No scar, No goiter   LUNGS:  No acute respiratory distress, non-labored respiration. Speaking full sentences  CARDIOVASCULAR:  regular rate   ABDOMEN:  obese  SKIN:  no bruising or bleeding, no rashes and no lesions, Skin is dry, no obvious rashes or lesions  EXTREMITIES: no gross abnormality   MSK: Moves extremities spontaneously. full range of motion in all major joints      DATA:     Pertinent data reviewed  CT 2023  Normal liver and gallbladder   Normal pancreas, spleen, adrenals normal size kidneys with no calculi or hydronephrosis        Ref Range & Units 12/15/23 12:59 PM   Glucose  70 - 99 mg/dL 325 High    Sodium  136 - 145 mmol/L 135 Low    Potassium  3.5 - 5.1 mmol/L 4.0   Chloride  98 - 112 mmol/L 102   CO2  21.0 - 32.0 mmol/L 27.0   Anion Gap  0 - 18 mmol/L 6   BUN  9 - 23 mg/dL 10   Creatinine  0.55 - 1.02 mg/dL 0.73   BUN/CREA Ratio  10.0 - 20.0 13.7   Calcium, Total  8.7 - 10.4 mg/dL 9.3   Calculated Osmolality  275 - 295 mOsm/kg 292   eGFR-Cr  >=60 mL/min/1.73m2 95   ALT  10 - 49 U/L 20   AST  <=34 U/L 20   Alkaline Phosphatase  46 -  118 U/L 126 High    Bilirubin, Total  0.3 - 1.2 mg/dL 0.4   Total Protein  5.7 - 8.2 g/dL 7.2   Albumin  3.2 - 4.8 g/dL 4.5   Globulin  2.8 - 4.4 g/dL 2.7 Low    A/G Ratio  1.0 - 2.0 1.7      Latest Reference Range & Units 08/23/23 16:08   HDL Cholesterol 40 - 59 mg/dL 64 (H)   VLDL 0 - 30 mg/dL 16   NON-HDL CHOLESTEROL <130 mg/dL 130 (H)   LDL Cholesterol Calc <100 mg/dL 113 (H)   (H): Data is abnormally high         No results for input(s): \"TSH\", \"T4F\", \"T3F\", \"THYP\" in the last 72 hours.  No results found.    POC HemoCue Glucose 201 (Finger stick glucose)        Component Value Flag Ref Range Units Status    GLUCOSE BLOOD 170        Final    Test Strip Lot # [id number]       Numeric Final    Test Strip Expiration Date 83,024       Date Final                  POC Glycohemoglobin [86286]        Component Value Flag Ref Range Units Status    HEMOGLOBIN A1C 8.3      4.3 - 5.6 % Final    Cartridge Lot# [id number]       Numeric Final    Cartridge Expiration Date 103,125       Date Final                    Orders Placed This Encounter   Procedures    Microalb/Creat Ratio, Random Urine [E]    Lipid Panel [E]    POC HemoCue Glucose 201 (Finger stick glucose)    POC Glycohemoglobin [48085]     Orders Placed This Encounter    Microalb/Creat Ratio, Random Urine [E]     Standing Status:   Future     Standing Expiration Date:   2/5/2025     Order Specific Question:   Release to patient     Answer:   Immediate    Lipid Panel [E]     Standing Status:   Future     Standing Expiration Date:   2/5/2025     Order Specific Question:   Release to patient     Answer:   Immediate    POC HemoCue Glucose 201 (Finger stick glucose)     Order Specific Question:   Release to patient     Answer:   Immediate    POC Glycohemoglobin [89995]     Order Specific Question:   Release to patient     Answer:   Immediate    Empagliflozin (JARDIANCE) 25 MG Oral Tab     Sig: Take 1 each by mouth daily.     Dispense:  90 tablet     Refill:  1          This is a  specialized patient consultation in endocrinology and required comprehensive review of prior records, as well as current evaluation, with time required for consideration of complex endocrine issues and consultation. For this visit, I personally interviewed the patient, and family member if accompanied, performed the pertinent parts of the history and physical examination. ROS included screening for appropriate endocrine conditions.   Today's diagnosis and plan were reviewed in detail with the patient who states understanding and agrees with plan. I discussed with the patient possible diagnosis, differential diagnosis, need for work up , treatment options, alternatives and side effects.     Please see note for details about time spent which includes:   · pre-visit preparation  · reviewing records  · face to face time with the patient   · timely documentation of the encounter  · ordering medications/tests  · communication with care team  · care coordination    I appreciate the opportunity to be part of your patient's medical care and will keep you, as the referring and primary physicians, informed about the care of your patient, including possible future surgery and pathology findings. Please feel free to contact me should you have any questions.      Efrain Galeano MD

## 2024-02-05 NOTE — PATIENT INSTRUCTIONS
CDE Consult - will benefit from more teaching . She already lost 25 lbs and made lifestyle changes. She brought A1c from 12 to 8%.     Target is 6.5% without hypoglycemia   Target BG   BEFORE MEAL 100-130mg/dl  2hrs AFTER MEAL less than 180mg/dl    Fasting AM labs   I will increase jardiance 10 to 25   Walk 180 minutes a week   No sugary drinks     Podiatry consult   Ophtho follow up     She does not want CGM samples,   She does not want statin  She does not want lisinopril  She deos not want mounjaro, ozempic or trulicity   She deos not want  metformin     She deos not want more meds   Will give printout to read   We discussed complications from uncontrolled diabetes and indication to start meds for preventions, statins, lisinoril,GLP-1..     If you have low blood sugar <70, take 15 grams of carb (8 oz juice or regular soda) and recheck in 15 minutes.    Follow up with podiatry and eye doctor annually.   Patients need to wear covered shoes all the time and check feet daily.   Bring your meter/BG log to the next visit.

## 2024-04-15 RX ORDER — BLOOD SUGAR DIAGNOSTIC
STRIP MISCELLANEOUS
Qty: 300 STRIP | Refills: 3 | Status: SHIPPED | OUTPATIENT
Start: 2024-04-15

## 2024-04-15 NOTE — TELEPHONE ENCOUNTER
Please review.   New rx needs Dr. Becerril signature please     Requested Prescriptions   Pending Prescriptions Disp Refills    Glucose Blood (ONETOUCH ULTRA) In Vitro Strip [Pharmacy Med Name: ONE TOUCH ULTRA BLUE TESTST(NEW)100] 300 strip 3     Sig: Use 1 new strip by in vitro route 3 (three) times daily for blood glucose monitoring       Diabetic Supplies Protocol Passed - 4/15/2024  6:43 PM        Passed - In person appointment or virtual visit in the past 12 mos or appointment in next 3 mos     Recent Outpatient Visits              2 months ago Type 2 diabetes mellitus without retinopathy (HCC)    [facility], Efrain Lee MD    Office Visit    2 months ago 6th nerve palsy, left    [facility], [place]a Mcgee MD    Office Visit    2 months ago Type 2 diabetes mellitus without retinopathy (HCC)    [facility], [place] Urbano MD    Office Visit    7 months ago New onset type 2 diabetes mellitus (HCC)    [facility] Pierre Rivera MD    Office Visit    1 year ago Routine physical examination    [facility], Pierre Rivera MD    Office Visit                        Lancet Devices (ONETOUCH DELICA LANCING DEV) Does not apply Misc 1 each 1     Si Device by Finger stick route 3 (three) times daily.       Diabetic Supplies Protocol Passed - 4/15/2024  6:43 PM        Passed - In person appointment or virtual visit in the past 12 mos or appointment in next 3 mos     Recent Outpatient Visits              2 months ago Type 2 diabetes mellitus without retinopathy (HCC)    [facility], Efrain Lee MD    Office Visit    2 months ago 6th nerve palsy, left    [facility], Ana Edmond MD    Office Visit    2  months ago Type 2 diabetes mellitus without retinopathy (HCC)    [facility], Orion Painter MD    Office Visit    7 months ago New onset type 2 diabetes mellitus (HCC)    [facility], Pierre Rivera MD    Office Visit    1 year ago Routine physical examination    [facility], Pierre Rivera MD    Office Visit                        OneTouch Delica Lancets 33G Does not apply Misc 300 each 3     Si Lancet by Finger stick route 3 (three) times daily.       Diabetic Supplies Protocol Passed - 4/15/2024  6:43 PM        Passed - In person appointment or virtual visit in the past 12 mos or appointment in next 3 mos     Recent Outpatient Visits              2 months ago Type 2 diabetes mellitus without retinopathy (HCC)    [facility], Efrain Lee MD    Office Visit    2 months ago 6th nerve palsy, left    [facility], Ana Edmond MD    Office Visit    2 months ago Type 2 diabetes mellitus without retinopathy (HCC)    [facility], Orion Painter MD    Office Visit    7 months ago New onset type 2 diabetes mellitus (HCC)    [facility], Pierre Rivera MD    Office Visit    1 year ago Routine physical examination    [facility], Pierre Rivera MD    Office Visit                           Recent Outpatient Visits              2 months ago Type 2 diabetes mellitus without retinopathy (HCC)    [facility], Efrain Lee MD    Office Visit    2 months ago 6th nerve palsy, left    [facility], Ana Edmond MD    Office Visit    2 months ago Type 2 diabetes mellitus without  retinopathy ([facility])    [facility] Orion Painter MD    Office Visit    7 months ago New onset type 2 diabetes mellitus ([facility])    [facility] Pierre Rivera MD    Office Visit    1 year ago Routine physical examination    [facility] Pierre Rivera MD    Office Visit

## 2024-04-16 RX ORDER — LANCING DEVICE/LANCETS
1 KIT MISCELLANEOUS 3 TIMES DAILY
Qty: 1 EACH | Refills: 1 | Status: SHIPPED | OUTPATIENT
Start: 2024-04-16

## 2024-04-16 RX ORDER — LANCETS 33 GAUGE
1 EACH MISCELLANEOUS 3 TIMES DAILY
Qty: 300 EACH | Refills: 3 | Status: SHIPPED | OUTPATIENT
Start: 2024-04-16

## 2024-04-16 NOTE — TELEPHONE ENCOUNTER
Message noted: Chart reviewed and may refill diabetic supplies as requested. Prescription sent to listed pharmacy. Pharmacy to notify patient.

## 2024-04-17 ENCOUNTER — TELEPHONE (OUTPATIENT)
Dept: ENDOCRINOLOGY | Facility: HOSPITAL | Age: 59
End: 2024-04-17

## 2024-04-23 ENCOUNTER — APPOINTMENT (OUTPATIENT)
Dept: ENDOCRINOLOGY | Facility: HOSPITAL | Age: 59
End: 2024-04-23
Attending: INTERNAL MEDICINE
Payer: COMMERCIAL

## 2024-05-16 ENCOUNTER — TELEPHONE (OUTPATIENT)
Dept: FAMILY MEDICINE CLINIC | Facility: CLINIC | Age: 59
End: 2024-05-16

## 2024-05-16 RX ORDER — AMLODIPINE BESYLATE 5 MG/1
5 TABLET ORAL DAILY
Qty: 90 TABLET | Refills: 3 | Status: SHIPPED | OUTPATIENT
Start: 2024-05-16

## 2024-05-16 NOTE — TELEPHONE ENCOUNTER
Message noted: Chart reviewed and may refill medication as requested. Prescription sent to listed pharmacy. Pharmacy to notify patient. Patient notified. Has been losing weight and seeing endocrine. Blood pressure has been good/ better. Last bp 127/73

## 2024-05-16 NOTE — TELEPHONE ENCOUNTER
Please review. Protocol Failed; No Protocol    Requested Prescriptions   Pending Prescriptions Disp Refills    AMLODIPINE 5 MG Oral Tab [Pharmacy Med Name: AMLODIPINE BESYLATE 5MG TABLETS] 90 tablet 1     Sig: TAKE 1 TABLET(5 MG) BY MOUTH DAILY       Hypertension Medications Protocol Failed - 5/14/2024  8:04 AM        Failed - Last BP reading less than 140/90     BP Readings from Last 1 Encounters:   02/05/24 145/80               Passed - CMP or BMP in past 12 months        Passed - In person appointment or virtual visit in the past 12 mos or appointment in next 3 mos     Recent Outpatient Visits              3 months ago Type 2 diabetes mellitus without retinopathy (HCC)    [facility], Efrain Lee MD    Office Visit    3 months ago 6th nerve palsy, left    [facility], Ana Edmond MD    Office Visit    3 months ago Type 2 diabetes mellitus without retinopathy (HCC)    [facility]    [facility] Visit    8 months ago New onset type 2 diabetes mellitus (HCC)    [facility] Pierre Rivera MD    Office Visit    1 year ago [facility] physical [facility]                      Passed - EGFRCR or GFRAA > 50     GFR Evaluation  EGFRCR: 95 , resulted on 12/15/2023                   Recent Outpatient Visits              3 months ago Type 2 diabetes mellitus without retinopathy (HCC)    [facility], Efrain Lee MD    Office Visit    3 months ago 6th nerve palsy, left    [facility], Ana Edmond MD    Office Visit    3 months ago Type 2 diabetes mellitus without retinopathy (HCC)    [facility]    Office Visit    8 months ago New onset type 2 diabetes mellitus (HCC)    [facility]    [facility]    1 year ago Routine physical examination    [facility]    [facility] Visit

## 2024-08-06 DIAGNOSIS — R73.09 HIGH GLUCOSE LEVEL: ICD-10-CM

## 2024-08-06 DIAGNOSIS — E11.9 TYPE 2 DIABETES MELLITUS WITHOUT RETINOPATHY (HCC): ICD-10-CM

## 2024-08-06 DIAGNOSIS — E11.65 UNCONTROLLED TYPE 2 DIABETES MELLITUS WITH HYPERGLYCEMIA (HCC): ICD-10-CM

## 2024-08-06 DIAGNOSIS — I10 ESSENTIAL HYPERTENSION: ICD-10-CM

## 2024-08-06 DIAGNOSIS — E11.65 HYPERGLYCEMIA DUE TO DIABETES MELLITUS (HCC): ICD-10-CM

## 2024-08-06 DIAGNOSIS — I10 PRIMARY HYPERTENSION: ICD-10-CM

## 2024-08-07 RX ORDER — EMPAGLIFLOZIN 25 MG/1
1 TABLET, FILM COATED ORAL DAILY
Qty: 90 TABLET | Refills: 1 | Status: SHIPPED | OUTPATIENT
Start: 2024-08-07

## 2024-08-07 NOTE — TELEPHONE ENCOUNTER
Endocrine Refill protocol for oral and injectable diabetic medications    Protocol Criteria:  FAILED    -Appointment with Endocrinology completed in the last 6 months or scheduled in the next 3 months    -A1c result below 8.5% in the past 6 months      Verify the above has been completed or scheduled in the appropriate timeline. If so can send a 90 day supply with 1 refill.     Last completed office visit: 2/5/2024 Efrain Galeano MD   Next scheduled Follow up: No future appointments. lmtcb  Last A1c result: Last A1c value was 8.3% done 2/5/2024.

## 2024-09-04 ENCOUNTER — TELEPHONE (OUTPATIENT)
Dept: OPHTHALMOLOGY | Facility: CLINIC | Age: 59
End: 2024-09-04

## 2024-09-04 NOTE — TELEPHONE ENCOUNTER
I called patient regarding the MRI of the brain and orbits from 2/02/24.  Patient states she never got the MRI because her double vision resolved.  I explained to patient that the ophthalmology department is closed and if she needs to follow-up with Dr. Sales she can call her at [facility].  Patient understands.

## 2024-09-16 ENCOUNTER — TELEPHONE (OUTPATIENT)
Dept: FAMILY MEDICINE CLINIC | Facility: CLINIC | Age: 59
End: 2024-09-16

## 2024-10-24 DIAGNOSIS — Z12.11 SCREENING FOR COLON CANCER: Primary | ICD-10-CM

## 2024-12-06 ENCOUNTER — OFFICE VISIT (OUTPATIENT)
Facility: CLINIC | Age: 59
End: 2024-12-06

## 2024-12-06 VITALS
DIASTOLIC BLOOD PRESSURE: 81 MMHG | WEIGHT: 168 LBS | HEIGHT: 64 IN | SYSTOLIC BLOOD PRESSURE: 140 MMHG | HEART RATE: 83 BPM | BODY MASS INDEX: 28.68 KG/M2

## 2024-12-06 DIAGNOSIS — I10 ESSENTIAL HYPERTENSION: ICD-10-CM

## 2024-12-06 DIAGNOSIS — E07.9 THYROID DISEASE: ICD-10-CM

## 2024-12-06 DIAGNOSIS — R73.09 HIGH GLUCOSE LEVEL: ICD-10-CM

## 2024-12-06 DIAGNOSIS — E11.9 TYPE 2 DIABETES MELLITUS WITHOUT RETINOPATHY (HCC): Primary | ICD-10-CM

## 2024-12-06 DIAGNOSIS — I10 PRIMARY HYPERTENSION: ICD-10-CM

## 2024-12-06 DIAGNOSIS — E11.65 HYPERGLYCEMIA DUE TO DIABETES MELLITUS (HCC): ICD-10-CM

## 2024-12-06 DIAGNOSIS — E11.65 UNCONTROLLED TYPE 2 DIABETES MELLITUS WITH HYPERGLYCEMIA (HCC): ICD-10-CM

## 2024-12-06 LAB
GLUCOSE BLOOD: 173
HEMOGLOBIN A1C: 8.5 % (ref 4.3–5.6)
TEST STRIP LOT #: NORMAL NUMERIC

## 2024-12-06 PROCEDURE — 3077F SYST BP >= 140 MM HG: CPT | Performed by: INTERNAL MEDICINE

## 2024-12-06 PROCEDURE — 3079F DIAST BP 80-89 MM HG: CPT | Performed by: INTERNAL MEDICINE

## 2024-12-06 PROCEDURE — 99214 OFFICE O/P EST MOD 30 MIN: CPT | Performed by: INTERNAL MEDICINE

## 2024-12-06 PROCEDURE — 83036 HEMOGLOBIN GLYCOSYLATED A1C: CPT | Performed by: INTERNAL MEDICINE

## 2024-12-06 PROCEDURE — 82947 ASSAY GLUCOSE BLOOD QUANT: CPT | Performed by: INTERNAL MEDICINE

## 2024-12-06 PROCEDURE — 3052F HG A1C>EQUAL 8.0%<EQUAL 9.0%: CPT | Performed by: INTERNAL MEDICINE

## 2024-12-06 PROCEDURE — 3008F BODY MASS INDEX DOCD: CPT | Performed by: INTERNAL MEDICINE

## 2024-12-06 RX ORDER — SEMAGLUTIDE 0.68 MG/ML
0.25 INJECTION, SOLUTION SUBCUTANEOUS WEEKLY
Qty: 9 ML | Refills: 0 | Status: SHIPPED | OUTPATIENT
Start: 2024-12-06

## 2024-12-06 NOTE — PATIENT INSTRUCTIONS
Fasting AM labs   Check with your insurance which med is covered ( Ozempic vs Trulicity vs Mounjaro)    Jardiance 25 mg /day   Ozempic Will give info to read about lisinopril, rosuvastatin, and mounjaro    Mg/wk    Check blood sugar fasting, before meals and before bedtime.   If you have low blood sugar <70, take 15 grams of carb (8 oz juice or regular soda) and recheck in 15 minutes.    Follow up with podiatry and eye doctor annually.   Patients need to wear covered shoes all the time and check feet daily.   Bring your meter/BG log to the next visit.      Target A1c 6.5%  Target BG   BEFORE MEAL 100-130mg/dl  2hrs AFTER MEAL less than 180mg/dl

## 2024-12-06 NOTE — PROGRESS NOTES
Reason for Visit:  uncontrolled DM  Requesting Physician:   ..Pierre Becerril MD  No referring provider defined for this encounter.   CHIEF COMPLAINT:    Chief Complaint   Patient presents with    Diabetes     F/u        HISTORY OF PRESENT ILLNESS:   Terry Jaimes is a 59 year old female who presents with uncontrolled DM, hyperglycemia, 6th nerve palsy, HTN, DLP and overWt   2024 b A1c: 8.5   We discussed her risk of complications again   She does not want CGM, or metformin.   We discussed statin and ACE/ARBs  She will read about them     She is ok w/ GLP-1a     She is on Jardiance 25 mg    DLP  Not on statin     HTN on amlodipine   Not on ACEi or ARBs            BG Random 107   She does not want CGM samples,        DM HISTORY  Diagnosed:  2023    CURRENT DIABETIC MEDICATIONS INCLUDE:  Jardiance     HISTORY OF DIABETES COMPLICATIONS: :  History of Retinopathy:  no 2024  History of Neuropathy:  no  History of Nephropathy: no    ASSOCIATED COMPLICATIONS:   HTN:  yes  Hyperlipidemia: yes  CAD/ASCVD/PAD/CVA:  no    HOME GLUCOSE READINGS:   As above     Lab Results   Component Value Date    A1C 8.5 (A) 2024    A1C 8.3 (A) 2024    A1C 12.1 (H) 2023    A1C 8.3 (H) 2015    A1C 8.6 (H) 2014        DM quality measures:  A1C/Blood pressure: as reported above   Last dilated eye exam: Last Dilated Eye Exam: 24     Exam shows retinopathy? Eye Exam shows Diabetic Retinopathy?: No    Last diabetic foot exam: No data recorded  Dentist : recommend every 6m  Nephropathy screening:   ace /arb rx:     LIPID screening: Cholesterol Meds:      Cholesterol: 194, done on 2023.  HDL Cholesterol: 64, done on 2023.  TriGlycerides 93, done on 2023.  LDL Cholesterol: 113, done on 2023.     Micro Albumen/Creatinine:  No results found for: \"MICROALBCREA\", \"MALBCREACALC\"      ASSESSMENT AND PLAN:  Terry Jaimes is a 59 year old female who presents with  uncontrolled DM, hyperglycemia, 6th nerve palsy, HTN, DLP and overWt     Plan  Fasting AM labs   Check with your insurance which med is covered ( Ozempic vs Trulicity vs Mounjaro)    Jardiance 25 mg /day   Ozempic Will give info to read about lisinopril, rosuvastatin, and mounjaro    Mg/wk    Check blood sugar fasting, before meals and before bedtime.   If you have low blood sugar <70, take 15 grams of carb (8 oz juice or regular soda) and recheck in 15 minutes.    Follow up with podiatry and eye doctor annually.   Patients need to wear covered shoes all the time and check feet daily.   Bring your meter/BG log to the next visit.      Target A1c 6.5%  Target BG   BEFORE MEAL 100-130mg/dl  2hrs AFTER MEAL less than 180mg/dl    Fasting AM labs   Check with your insurance which med is covered ( Ozempic vs Trulicity vs Mounjaro)    Jardiance 25 mg /day   Ozempic Will give info to read about lisinopril, rosuvastatin, and mounjaro    Mg/wk    Check blood sugar fasting, before meals and before bedtime.   If you have low blood sugar <70, take 15 grams of carb (8 oz juice or regular soda) and recheck in 15 minutes.    Follow up with podiatry and eye doctor annually.   Patients need to wear covered shoes all the time and check feet daily.   Bring your meter/BG log to the next visit.      Target A1c 6.5%  Target BG   BEFORE MEAL 100-130mg/dl  2hrs AFTER MEAL less than 180mg/dl           Check blood sugar fasting, before meals and before bedtime.   If you have low blood sugar <70, take 15 grams of carb (8 oz juice or regular soda) and recheck in 15 minutes.    Follow up with podiatry and eye doctor annually.   Patients need to wear covered shoes all the time and check feet daily.   Bring your meter/BG log to the next visit.      Target A1c 6.5%  Target BG   BEFORE MEAL 100-130mg/dl  2hrs AFTER MEAL less than 180mg/dl    GLP-1 agonists:  No personal or family history of MEN syndrome   No personal history pancreatitis   Patient  counselled regarding side effects including injection site reactions, nausea, vomiting, diarrhea, pancreatitis, gastroparesis and rare side effect roberto French syndrome.    SGLT2 inhibitors  No UTI or yeast infection   Discussed side effects including UTI and fungal infections.   Discussed the importance of hydration.        We discussed these in detail with the patient and negotiated which of numerous possible changes in the in the treatment plan that would be acceptable to them. The patient remains at ongoing is at high risk for complications related to uncontrolled diabetes and treatment.  The patient requires a great deal of self-management and support. We expect the patient's risk to be reduced with the changes to the treatment plan that we recommended today.   We reviewed a very large amount of complicated data including BG target range   PAST MEDICAL HISTORY:   Past Medical History:    Breast hypertrophy    reduction mammoplasty 10/2010    Hypertension    Lipid screening    per NG    Mild intermittent asthma (HCC)    drug therapy    Nephrolithiasis    lithotripsy       PAST SURGICAL HISTORY:   Past Surgical History:   Procedure Laterality Date    Lithotripsy  01/01/1995    Reduction left      2010    Reduction of large breast  01/01/2010    Reduction right      2010       CURRENT MEDICATIONS:     empagliflozin (JARDIANCE) 25 MG Oral Tab Take 1 tablet (25 mg total) by mouth daily. 90 tablet 1    semaglutide (OZEMPIC, 0.25 OR 0.5 MG/DOSE,) 2 MG/3ML Subcutaneous Solution Pen-injector Inject 0.25 mg into the skin once a week. 9 mL 0       ALLERGIES:  Allergies[1]    SOCIAL HISTORY:    Social History     Socioeconomic History    Marital status:    Tobacco Use    Smoking status: Never    Smokeless tobacco: Never   Vaping Use    Vaping status: Never Used   Substance and Sexual Activity    Alcohol use: No    Drug use: Never   Other Topics Concern    Caffeine Concern Yes     Comment: soda-16 oz./day      FAMILY HISTORY:   Family History   Problem Relation Age of Onset    Macular degeneration Mother     Diabetes Mother 68    Hypertension Other         family h/o    Glaucoma Neg         PHYSICAL EXAM:   Height: 5' 4\" (162.6 cm) (12/06 1330)  Weight: 168 lb (76.2 kg) (12/06 1330)  BSA (Calculated - sq m): 1.82 sq meters (12/06 1330)  Pulse: 83 (12/06 1330)  BP: 140/81 (12/06 1330)  Temp: --  Do Not Use - Resp Rate: --  SpO2: --    Body mass index is 28.84 kg/m².  Wt Readings from Last 6 Encounters:   12/06/24 168 lb (76.2 kg)   02/05/24 165 lb (74.8 kg)   12/15/23 171 lb (77.6 kg)   09/11/23 177 lb (80.3 kg)   04/11/23 188 lb (85.3 kg)   06/28/22 185 lb (83.9 kg)       CONSTITUTIONAL:  Awake and alert. Age appropriate, good hygiene not in acute distress. Well-nourished and well developed. no acute distress   PSYCH:   Orientated to time, place, person & situation, Normal mood and affect, memory intact, normal insight and judgment, cooperative  Neuro: speech is clear. Awake, alert, no aphasia, no facial asymmetry, no nuchal rigidity  EYES:  No proptosis, no ptosis, conjunctiva normal  ENT:  Normocephalic, atraumatic  Eye: EOMI, normal lids, no discharge, no conjunctival erythema. No exophthalmos/proptosis, Ptosis negative   No rhinorrhea, moist oral mucosa  Neck: full range of motion         DATA:     Pertinent data reviewed     Lab Results   Component Value Date    A1C 8.5 (A) 12/06/2024    A1C 8.3 (A) 02/05/2024    A1C 12.1 (H) 08/23/2023    A1C 8.3 (H) 05/29/2015    A1C 8.6 (H) 04/22/2014      Cholesterol: 194, done on 8/23/2023.  HDL Cholesterol: 64, done on 8/23/2023.  LDL Cholesterol: 113, done on 8/23/2023.  TriGlycerides 93, done on 8/23/2023.    No results found.  Micro Albumen/Creatinine:  No results found for: \"MICROALBCREA\", \"MALBCREACALC\"   Diabetes:    Lab Results   Component Value Date    A1C 8.5 (A) 12/06/2024    A1C 8.3 (A) 02/05/2024    A1C 12.1 (H) 08/23/2023     (H) 08/23/2023     Lab  Results   Component Value Date    CHOLEST 194 08/23/2023    CHOLEST 156 05/29/2015    CHOLEST 143 04/17/2014     Lab Results   Component Value Date    HDL 64 (H) 08/23/2023    HDL 49 05/29/2015    HDL 44 04/17/2014     Lab Results   Component Value Date     (H) 08/23/2023    LDL 96 05/29/2015    LDL 89 04/17/2014     Lab Results   Component Value Date    TRIG 93 08/23/2023    TRIG 57 05/29/2015    TRIG 50 04/17/2014     Lab Results   Component Value Date    AST 20 12/15/2023    AST 12 (L) 08/23/2023    AST 17 05/29/2015     Lab Results   Component Value Date    ALT 20 12/15/2023    ALT 17 08/23/2023    ALT 18 05/29/2015         No results for input(s): \"TSH\", \"T4F\", \"T3F\", \"THYP\" in the last 72 hours.     POC HemoCue Glucose 201 (Finger stick glucose)        Component Value Flag Ref Range Units Status    GLUCOSE BLOOD 173        Final    Test Strip Lot # [id number]       Numeric Final    Test Strip Expiration Date 4/3/25       Date Final                  POC Glycohemoglobin [38140]        Component Value Flag Ref Range Units Status    HEMOGLOBIN A1C 8.5      4.3 - 5.6 % Final    Cartridge Lot# [id number]       Numeric Final    Cartridge Expiration Date 8/21/26       Date Final                  Orders Placed This Encounter   Procedures    POC HemoCue Glucose 201 (Finger stick glucose)    POC Glycohemoglobin [62505]    Lipid Panel    Comp Metabolic Panel (14)    Microalb/Creat Ratio, Random Urine    TSH W Reflex To Free T4     Orders Placed This Encounter    POC HemoCue Glucose 201 (Finger stick glucose)     Order Specific Question:   Release to patient     Answer:   Immediate    POC Glycohemoglobin [08892]     Order Specific Question:   Release to patient     Answer:   Immediate    Lipid Panel     Standing Status:   Future     Standing Expiration Date:   12/6/2025     Order Specific Question:   Release to patient     Answer:   Immediate    Comp Metabolic Panel (14)     Standing Status:   Future     Standing  Expiration Date:   12/6/2025     Order Specific Question:   Release to patient     Answer:   Immediate    Microalb/Creat Ratio, Random Urine     Standing Status:   Future     Standing Expiration Date:   12/6/2025     Order Specific Question:   Release to patient     Answer:   Immediate    TSH W Reflex To Free T4     Standing Status:   Future     Standing Expiration Date:   12/6/2025     Order Specific Question:   Release to patient     Answer:   Immediate    empagliflozin (JARDIANCE) 25 MG Oral Tab     Sig: Take 1 tablet (25 mg total) by mouth daily.     Dispense:  90 tablet     Refill:  1    semaglutide (OZEMPIC, 0.25 OR 0.5 MG/DOSE,) 2 MG/3ML Subcutaneous Solution Pen-injector     Sig: Inject 0.25 mg into the skin once a week.     Dispense:  9 mL     Refill:  0      TSH   Date Value Ref Range Status   08/23/2023 0.548 0.358 - 3.740 mIU/mL Final     Comment:     This test may exhibit interference when a sample is collected from a person who is consuming high dose of biotin (a.k.a., vitamin B7, vitamin H, coenzyme R) supplements resulting in serum concentrations >100 ng/mL.  Intake of the recommended daily allowance (RDA) for biotin (0.03 mg) has not been shown to typically cause significant interference; however, high dose daily dietary supplements may contain biotin concentrations greater than 150 times (5-10 mg) the RDA.  It is recommended that physicians ask all patients who may be on biotin supplementation to stop biotin consumption at least 72 hours prior to collection of a new sample.       TSH (S)   Date Value Ref Range Status   05/29/2015 0.41 0.34 - 5.60 uIU/mL Final     @[facility]   No orders of the defined types were placed in this encounter.       This is a specialized patient consultation in endocrinology and required comprehensive review of prior records, as well as current evaluation, with time required for consideration of complex endocrine issues and consultation. For this visit, I personally  interviewed the patient, and family member if accompanied, performed the pertinent parts of the history and physical examination. ROS included screening for appropriate endocrine conditions.   Today's diagnosis and plan were reviewed in detail with the patient who states understanding and agrees with plan. I discussed with the patient possible diagnosis, differential diagnosis, need for work up, treatment options, alternatives and side effects.     Please see note for details about time spent which includes:   · pre-visit preparation  · reviewing records  · face to face time with the patient   · timely documentation of the encounter  · ordering medications/tests  · communication with care team  · care coordination    I appreciate the opportunity to be part of your patient's medical care and will keep you, as the referring and primary physicians, informed about the care of your patient. Please feel free to contact me should you have any questions.      The 21st Century Cures Act makes medical notes like these available to patients in the interest of transparency. Please be advised this is a medical document. Medical documents are intended to carry relevant information, facts as evident, and the clinical opinion of the practitioner. The medical note is intended as peer to peer communication and may appear blunt or direct. It is written in medical language and may contain abbreviations or verbiage that are unfamiliar.     Efrain Galeano MD         [1] No Known Allergies

## 2025-01-17 DIAGNOSIS — E11.65 HYPERGLYCEMIA DUE TO DIABETES MELLITUS (HCC): ICD-10-CM

## 2025-01-17 DIAGNOSIS — E11.9 TYPE 2 DIABETES MELLITUS WITHOUT RETINOPATHY (HCC): ICD-10-CM

## 2025-01-17 DIAGNOSIS — I10 ESSENTIAL HYPERTENSION: ICD-10-CM

## 2025-01-17 DIAGNOSIS — I10 PRIMARY HYPERTENSION: ICD-10-CM

## 2025-01-17 DIAGNOSIS — R73.09 HIGH GLUCOSE LEVEL: ICD-10-CM

## 2025-01-17 DIAGNOSIS — E11.65 UNCONTROLLED TYPE 2 DIABETES MELLITUS WITH HYPERGLYCEMIA (HCC): ICD-10-CM

## 2025-01-17 NOTE — TELEPHONE ENCOUNTER
Endocrine Refill protocol for oral and injectable diabetic medications    Protocol Criteria:  FAILED  Reason: Elevated A1C    If all below requirements are met, send a 90-day supply with 1 refill per provider protocol.    Verify appointment with Endocrinology completed in the last 6 months or scheduled in the next 3 months.  Verify A1C has been completed within the last 6 months and is below 8.5%     Last completed office visit: 12/6/2024 Efrain Galeano MD   Next scheduled Follow up: No future appointments.   Last A1c result: Last A1c value was 8.5% done 12/6/2024.

## 2025-01-17 NOTE — TELEPHONE ENCOUNTER
A 6 month supply was already sent on 12/6/24 to the same pharmacy.     Attempted to contact pharmacy to inquire but call was disconnected. Will try at later time.

## 2025-01-24 ENCOUNTER — PATIENT MESSAGE (OUTPATIENT)
Facility: LOCATION | Age: 60
End: 2025-01-24

## 2025-01-24 ENCOUNTER — TELEPHONE (OUTPATIENT)
Dept: ENDOCRINOLOGY CLINIC | Facility: CLINIC | Age: 60
End: 2025-01-24

## 2025-01-24 DIAGNOSIS — E11.9 TYPE 2 DIABETES MELLITUS WITHOUT RETINOPATHY (HCC): Primary | ICD-10-CM

## 2025-01-24 RX ORDER — SEMAGLUTIDE 1.34 MG/ML
1 INJECTION, SOLUTION SUBCUTANEOUS WEEKLY
Qty: 9 ML | Refills: 1 | Status: SHIPPED | OUTPATIENT
Start: 2025-01-24

## 2025-01-24 RX ORDER — EMPAGLIFLOZIN 25 MG/1
25 TABLET, FILM COATED ORAL DAILY
Qty: 90 TABLET | Refills: 1 | Status: SHIPPED | OUTPATIENT
Start: 2025-01-24

## 2025-01-24 RX ORDER — PEN NEEDLE, DIABETIC 30 GX3/16"
1 NEEDLE, DISPOSABLE MISCELLANEOUS WEEKLY
Qty: 10 EACH | Refills: 0 | Status: SHIPPED | OUTPATIENT
Start: 2025-01-24 | End: 2025-03-21

## 2025-01-24 NOTE — TELEPHONE ENCOUNTER
Received fax from [facility] - patient needs pen needles for ozempic - RX sent    Dr. Galeano,  Patient started ozempic 0.25mg weekly on 12/6/24 - please advise if patient should increase to 0.5mg ozempic -thanks

## 2025-02-27 ENCOUNTER — TELEPHONE (OUTPATIENT)
Dept: ENDOCRINOLOGY CLINIC | Facility: CLINIC | Age: 60
End: 2025-02-27

## 2025-02-27 ENCOUNTER — LAB ENCOUNTER (OUTPATIENT)
Dept: LAB | Facility: HOSPITAL | Age: 60
End: 2025-02-27
Attending: INTERNAL MEDICINE
Payer: COMMERCIAL

## 2025-02-27 DIAGNOSIS — I10 PRIMARY HYPERTENSION: ICD-10-CM

## 2025-02-27 DIAGNOSIS — E11.65 UNCONTROLLED TYPE 2 DIABETES MELLITUS WITH HYPERGLYCEMIA (HCC): ICD-10-CM

## 2025-02-27 DIAGNOSIS — E07.9 THYROID DISEASE: ICD-10-CM

## 2025-02-27 DIAGNOSIS — E11.65 HYPERGLYCEMIA DUE TO DIABETES MELLITUS (HCC): ICD-10-CM

## 2025-02-27 DIAGNOSIS — E07.9 THYROID DISEASE: Primary | ICD-10-CM

## 2025-02-27 DIAGNOSIS — R73.09 HIGH GLUCOSE LEVEL: ICD-10-CM

## 2025-02-27 DIAGNOSIS — E11.9 TYPE 2 DIABETES MELLITUS WITHOUT RETINOPATHY (HCC): ICD-10-CM

## 2025-02-27 LAB
ALBUMIN SERPL-MCNC: 4.4 G/DL (ref 3.2–4.8)
ALBUMIN/GLOB SERPL: 1.7 {RATIO} (ref 1–2)
ALP LIVER SERPL-CCNC: 75 U/L
ALT SERPL-CCNC: 9 U/L
ANION GAP SERPL CALC-SCNC: 7 MMOL/L (ref 0–18)
AST SERPL-CCNC: 14 U/L (ref ?–34)
BILIRUB SERPL-MCNC: 0.8 MG/DL (ref 0.2–1.1)
BUN BLD-MCNC: 9 MG/DL (ref 9–23)
BUN/CREAT SERPL: 16.7 (ref 10–20)
CALCIUM BLD-MCNC: 9.2 MG/DL (ref 8.7–10.4)
CHLORIDE SERPL-SCNC: 101 MMOL/L (ref 98–112)
CHOLEST SERPL-MCNC: 166 MG/DL (ref ?–200)
CO2 SERPL-SCNC: 31 MMOL/L (ref 21–32)
CREAT BLD-MCNC: 0.54 MG/DL
CREAT UR-SCNC: 90.3 MG/DL
EGFRCR SERPLBLD CKD-EPI 2021: 105 ML/MIN/1.73M2 (ref 60–?)
FASTING PATIENT LIPID ANSWER: YES
FASTING STATUS PATIENT QL REPORTED: YES
GLOBULIN PLAS-MCNC: 2.6 G/DL (ref 2–3.5)
GLUCOSE BLD-MCNC: 102 MG/DL (ref 70–99)
HDLC SERPL-MCNC: 41 MG/DL (ref 40–59)
LDLC SERPL CALC-MCNC: 110 MG/DL (ref ?–100)
MICROALBUMIN UR-MCNC: <0.3 MG/DL
NONHDLC SERPL-MCNC: 125 MG/DL (ref ?–130)
OSMOLALITY SERPL CALC.SUM OF ELEC: 287 MOSM/KG (ref 275–295)
POTASSIUM SERPL-SCNC: 3.4 MMOL/L (ref 3.5–5.1)
PROT SERPL-MCNC: 7 G/DL (ref 5.7–8.2)
SODIUM SERPL-SCNC: 139 MMOL/L (ref 136–145)
T3FREE SERPL-MCNC: 2.76 PG/ML (ref 2.4–4.2)
T4 FREE SERPL-MCNC: 1.5 NG/DL (ref 0.8–1.7)
TRIGL SERPL-MCNC: 78 MG/DL (ref 30–149)
TSI SER-ACNC: 0.38 UIU/ML (ref 0.55–4.78)
VLDLC SERPL CALC-MCNC: 13 MG/DL (ref 0–30)

## 2025-02-27 PROCEDURE — 84443 ASSAY THYROID STIM HORMONE: CPT

## 2025-02-27 PROCEDURE — 84439 ASSAY OF FREE THYROXINE: CPT

## 2025-02-27 PROCEDURE — 80053 COMPREHEN METABOLIC PANEL: CPT

## 2025-02-27 PROCEDURE — 84481 FREE ASSAY (FT-3): CPT

## 2025-02-27 PROCEDURE — 36415 COLL VENOUS BLD VENIPUNCTURE: CPT

## 2025-02-27 PROCEDURE — 82043 UR ALBUMIN QUANTITATIVE: CPT

## 2025-02-27 PROCEDURE — 82570 ASSAY OF URINE CREATININE: CPT

## 2025-02-27 PROCEDURE — 80061 LIPID PANEL: CPT

## 2025-02-27 NOTE — TELEPHONE ENCOUNTER
Please call pt with work up result   Low TSH   schedule an appt in 1 mo and repeat labs before the appt      Will discuss more next visit   Thanks

## 2025-03-03 ENCOUNTER — TELEPHONE (OUTPATIENT)
Dept: ENDOCRINOLOGY CLINIC | Facility: CLINIC | Age: 60
End: 2025-03-03

## 2025-03-03 NOTE — TELEPHONE ENCOUNTER
Patient is requesting to speak directly to Dr. Galeano about a medicine did not give details please call

## 2025-03-06 NOTE — TELEPHONE ENCOUNTER
Patient states that she feels sick all the time and is fees depressed and thinks it is due to increased dosage of 0.5mg Ozempic so she stopped taking.  Please call.

## 2025-03-07 NOTE — TELEPHONE ENCOUNTER
Last office visit 12/6/24 for several dx including type 2 diabetes. Was at time already taking jardiance 25 mg daily. Started ozempic 0.25 mg weekly on 12/6/24. Dose increased to 0.5 mg weekly on 1/24/25. Low TSH level on 2/27/24. Patient was advised to repeat levels in 1 month and follow up. No follow up currently scheduled.     Called the patient to triage. Left message to call back. Call center, if patient returns call please attempt to reach out to RNs via high priority line.     Mychart sent as well with further questions.

## 2025-03-14 RX ORDER — SEMAGLUTIDE 0.68 MG/ML
0.25 INJECTION, SOLUTION SUBCUTANEOUS WEEKLY
Qty: 9 ML | Refills: 0 | Status: CANCELLED | OUTPATIENT
Start: 2025-03-14

## 2025-03-15 NOTE — TELEPHONE ENCOUNTER
Endocrine Refill protocol for oral and injectable diabetic medications    Protocol Criteria:  PASSED  Reason: N/A    If all below requirements are met, send a 90-day supply with 1 refill per provider protocol.    Verify appointment with Endocrinology completed in the last 6 months or scheduled in the next 3 months.  Verify A1C has been completed within the last 6 months and is below 8.5%     Last completed office visit: 12/6/2024 Efrain Galeano MD   Next scheduled Follow up:   Future Appointments   Date Time Provider Department Center   3/21/2025 11:00 AM Efrain Galeano MD [id number]      Last A1c result: Last A1c value was 8.5% done 12/6/2024.

## 2025-03-17 NOTE — TELEPHONE ENCOUNTER
Called pt and notified she can continue with Ozempic 0.25 mg as long as she has not adverse reactions or SI. Patient verbalized understanding and notified she only have 1 mg dose pen. Advised pt to count 18 clicks for 0.25 mg dose.

## 2025-03-17 NOTE — TELEPHONE ENCOUNTER
Dr. Galeano,     Patient states that she restarted Ozempic at lower dose due to appetite suppression and depression while on 0.5 mg dose.   Patient injected on 3/14 Ozempic 0.25 mg.   Patient reported constipation and some nausea but it has gotten better.     Patient stated while on increased dose was having depressive thoughts and suicidal ideation. Patient denies any SI or depression at this time. Patient feels better now on 0.25 mg dose.   Patient thinks this may be due to her taking Nyquil and Ozempic at the same time since this has never occurred and she does not have history of depression.     Patient states BG have been well, ranging 74 to 102. BG while on phone 125. Patient only consumes one meal daily.     Med Regimen   Ozempic 0.25 mg  Jardiance 25 mg

## 2025-03-17 NOTE — TELEPHONE ENCOUNTER
Ok to continue Ozempic 0.25 mg if not getting any adverse reaction or negative thoughts. Stop if if getting suicidal ideation or depression   Will discuss more next visit   Thanks

## 2025-03-19 ENCOUNTER — LAB ENCOUNTER (OUTPATIENT)
Dept: LAB | Facility: HOSPITAL | Age: 60
End: 2025-03-19
Attending: INTERNAL MEDICINE
Payer: COMMERCIAL

## 2025-03-19 DIAGNOSIS — E07.9 THYROID DISEASE: ICD-10-CM

## 2025-03-19 LAB — TSI SER-ACNC: 0.8 UIU/ML (ref 0.55–4.78)

## 2025-03-19 PROCEDURE — 84443 ASSAY THYROID STIM HORMONE: CPT

## 2025-03-19 PROCEDURE — 36415 COLL VENOUS BLD VENIPUNCTURE: CPT

## 2025-03-21 ENCOUNTER — OFFICE VISIT (OUTPATIENT)
Facility: CLINIC | Age: 60
End: 2025-03-21

## 2025-03-21 ENCOUNTER — TELEPHONE (OUTPATIENT)
Facility: LOCATION | Age: 60
End: 2025-03-21

## 2025-03-21 VITALS
HEART RATE: 87 BPM | HEIGHT: 64 IN | WEIGHT: 163 LBS | DIASTOLIC BLOOD PRESSURE: 63 MMHG | BODY MASS INDEX: 27.83 KG/M2 | SYSTOLIC BLOOD PRESSURE: 122 MMHG

## 2025-03-21 DIAGNOSIS — E11.65 UNCONTROLLED TYPE 2 DIABETES MELLITUS WITH HYPERGLYCEMIA (HCC): ICD-10-CM

## 2025-03-21 DIAGNOSIS — I10 ESSENTIAL HYPERTENSION: ICD-10-CM

## 2025-03-21 DIAGNOSIS — E11.65 HYPERGLYCEMIA DUE TO DIABETES MELLITUS (HCC): ICD-10-CM

## 2025-03-21 DIAGNOSIS — K59.00 CONSTIPATION, UNSPECIFIED CONSTIPATION TYPE: ICD-10-CM

## 2025-03-21 DIAGNOSIS — E11.9 TYPE 2 DIABETES MELLITUS WITHOUT RETINOPATHY (HCC): ICD-10-CM

## 2025-03-21 DIAGNOSIS — I10 PRIMARY HYPERTENSION: ICD-10-CM

## 2025-03-21 DIAGNOSIS — R73.09 HIGH GLUCOSE LEVEL: Primary | ICD-10-CM

## 2025-03-21 LAB
GLUCOSE BLOOD: 176
HEMOGLOBIN A1C: 6.9 % (ref 4.3–5.6)
TEST STRIP LOT #: NORMAL NUMERIC

## 2025-03-21 RX ORDER — PEN NEEDLE, DIABETIC 30 GX3/16"
1 NEEDLE, DISPOSABLE MISCELLANEOUS WEEKLY
Qty: 10 EACH | Refills: 0 | Status: SHIPPED | OUTPATIENT
Start: 2025-03-21

## 2025-03-21 RX ORDER — SEMAGLUTIDE 0.68 MG/ML
0.25 INJECTION, SOLUTION SUBCUTANEOUS WEEKLY
Qty: 9 ML | Refills: 0 | Status: SHIPPED | OUTPATIENT
Start: 2025-03-21

## 2025-03-21 RX ORDER — DOCUSATE SODIUM 100 MG/1
100 CAPSULE, LIQUID FILLED ORAL 2 TIMES DAILY
Qty: 180 CAPSULE | Refills: 0 | Status: SHIPPED | OUTPATIENT
Start: 2025-03-21 | End: 2025-06-19

## 2025-03-21 NOTE — TELEPHONE ENCOUNTER
Received a fax of patients most recent eye exam dated on 3/19/25 with Tiffanie Morales OD at [facility]. Eye exam was documented in patient's 'Diabetes Flowsheet' and placed in providers folder for review.

## 2025-03-21 NOTE — PROGRESS NOTES
Reason for Visit:  uncontrolled DM  Requesting Physician:   ..Pierre Becerril MD  No referring provider defined for this encounter.   CHIEF COMPLAINT:    Chief Complaint   Patient presents with    Diabetes     F/u        HISTORY OF PRESENT ILLNESS:   Terry Jaimes is a 60 year old female who presents with uncontrolled DM, hyperglycemia, 6th nerve palsy, HTN, DLP and overWt   3/21/2025   b A1c: 6.9  She did not tolerate ozempic 0.5 but might be d/t complication with over the counter congestion meds        DLP  Not on statin     HTN on amlodipine   Not on ACEi or ARBs       DM HISTORY  Diagnosed:  2023    CURRENT DIABETIC MEDICATIONS INCLUDE:  She is on Jardiance 25 mg and ozempic 0.25 mg/wk   She got suicide ideation from 0.5 mg dose    HISTORY OF DIABETES COMPLICATIONS: :  History of Retinopathy:  no 2024  History of Neuropathy:  no  History of Nephropathy: no    ASSOCIATED COMPLICATIONS:   HTN:  yes  Hyperlipidemia: yes  CAD/ASCVD/PAD/CVA:  no    HOME GLUCOSE READINGS:   As above     Lab Results   Component Value Date    A1C 8.5 (A) 2024    A1C 8.3 (A) 2024    A1C 12.1 (H) 2023    A1C 8.3 (H) 2015    A1C 8.6 (H) 2014        DM quality measures:  A1C/Blood pressure: as reported above   Last dilated eye exam: No data recorded   3/2025  Exam shows retinopathy? No data recorded    Last diabetic foot exam: No data recorded  Dentist : recommend every 6m  Nephropathy screening:   ace /arb rx:     LIPID screening: Cholesterol Meds:      Cholesterol: 166, done on 2025.  HDL Cholesterol: 41, done on 2025.  TriGlycerides 78, done on 2025.  LDL Cholesterol: 110, done on 2025.     Micro Albumen/Creatinine:    Lab Results   Component Value Date    MICROALBCREA  2025      Comment:      Unable to calculate due to Urine Microalbumin <0.3 mg/dL             ASSESSMENT AND PLAN:  Terry Jaimes is a 60 year old female who presents with uncontrolled DM,  hyperglycemia, 6th nerve palsy, HTN, DLP and overWt     Had labs . We discussed them. TSH was low then normal when repeated.   LDL is high but she does not want statin. She will work on lifestyle changes.   We discussed her risk of complications again   She does not want CGM, or metformin.   We discussed statin and ACE/ARBs  She will read about them  Plan   RTC in 3 mo   Will refer to podiatry   Jardiance 25 mg /day   Ozempic 0.25 Mg/wk. Will increase as tolerated   We gave info to read about lisinopril, rosuvastatin  3/21/2025 : b A1c: 6.9    Check blood sugar fasting, before meals and before bedtime.   If you have low blood sugar <70, take 15 grams of carb (8 oz juice or regular soda) and recheck in 15 minutes.    Follow up with podiatry and eye doctor annually.   Patients need to wear covered shoes all the time and check feet daily.   Bring your meter/BG log to the next visit.      Target A1c 6.5%  Target BG   BEFORE MEAL 100-130mg/dl  2hrs AFTER MEAL less than 180mg/dl    Fasting AM labs   Check with your insurance which med is covered ( Ozempic vs Trulicity vs Mounjaro)    Jardiance 25 mg /day   Ozempic Will give info to read about lisinopril, rosuvastatin, and mounjaro    Mg/wk    Check blood sugar fasting, before meals and before bedtime.   If you have low blood sugar <70, take 15 grams of carb (8 oz juice or regular soda) and recheck in 15 minutes.    Follow up with podiatry and eye doctor annually.   Patients need to wear covered shoes all the time and check feet daily.   Bring your meter/BG log to the next visit.      Target A1c 6.5%  Target BG   BEFORE MEAL 100-130mg/dl  2hrs AFTER MEAL less than 180mg/dl            Check blood sugar fasting, before meals and before bedtime.   If you have low blood sugar <70, take 15 grams of carb (8 oz juice or regular soda) and recheck in 15 minutes.    Follow up with podiatry and eye doctor annually.   Patients need to wear covered shoes all the time and check  feet daily.   Bring your meter/BG log to the next visit.      Target A1c 6.5%  Target BG   BEFORE MEAL 100-130mg/dl  2hrs AFTER MEAL less than 180mg/dl    GLP-1 agonists:  No personal or family history of MEN syndrome   No personal history pancreatitis   Patient counselled regarding side effects including injection site reactions, nausea, vomiting, diarrhea, pancreatitis, gastroparesis and rare side effect roberto French syndrome.    SGLT2 inhibitors  No UTI or yeast infection   Discussed side effects including UTI and fungal infections.   Discussed the importance of hydration.        We discussed these in detail with the patient and negotiated which of numerous possible changes in the in the treatment plan that would be acceptable to them. The patient remains at ongoing is at high risk for complications related to uncontrolled diabetes and treatment.  The patient requires a great deal of self-management and support. We expect the patient's risk to be reduced with the changes to the treatment plan that we recommended today.   We reviewed a very large amount of complicated data including BG target range   PAST MEDICAL HISTORY:   Past Medical History:    Breast hypertrophy    reduction mammoplasty 10/2010    Hypertension    Lipid screening    per NG    Mild intermittent asthma (HCC)    drug therapy    Nephrolithiasis    lithotripsy       PAST SURGICAL HISTORY:   Past Surgical History:   Procedure Laterality Date    Lithotripsy  01/01/1995    Reduction left      2010    Reduction of large breast  01/01/2010    Reduction right      2010       CURRENT MEDICATIONS:     semaglutide (OZEMPIC, 0.25 OR 0.5 MG/DOSE,) 2 MG/3ML Subcutaneous Solution Pen-injector Inject 0.25 mg into the skin once a week. 9 mL 0    empagliflozin (JARDIANCE) 25 MG Oral Tab Take 1 tablet (25 mg total) by mouth daily. 90 tablet 1    Insulin Pen Needle (PEN NEEDLES) 32G X 4 MM Does not apply Misc 1 each once a week. Use as directed to inject ozempic  weekly 10 each 0    docusate sodium 100 MG Oral Cap Take 1 capsule (100 mg total) by mouth 2 (two) times daily. 180 capsule 0       ALLERGIES:  Allergies[1]    SOCIAL HISTORY:    Social History     Socioeconomic History    Marital status:    Tobacco Use    Smoking status: Never    Smokeless tobacco: Never   Vaping Use    Vaping status: Never Used   Substance and Sexual Activity    Alcohol use: No    Drug use: Never   Other Topics Concern    Caffeine Concern Yes     Comment: soda-16 oz./day     FAMILY HISTORY:   Family History   Problem Relation Age of Onset    Macular degeneration Mother     Diabetes Mother 68    Hypertension Other         family h/o    Glaucoma Neg         PHYSICAL EXAM:   Height: 5' 4\" (162.6 cm) (03/21 1110)  Weight: 163 lb (73.9 kg) (03/21 1110)  BSA (Calculated - sq m): 1.79 sq meters (03/21 1110)  Pulse: 87 (03/21 1110)  BP: 122/63 (03/21 1110)  Temp: --  Do Not Use - Resp Rate: --  SpO2: --    Body mass index is 27.98 kg/m².  Wt Readings from Last 6 Encounters:   03/21/25 163 lb (73.9 kg)   12/06/24 168 lb (76.2 kg)   02/05/24 165 lb (74.8 kg)   12/15/23 171 lb (77.6 kg)   09/11/23 177 lb (80.3 kg)   04/11/23 188 lb (85.3 kg)       CONSTITUTIONAL:  Awake and alert. Age appropriate, good hygiene not in acute distress. Well-nourished and well developed. no acute distress   PSYCH:   Orientated to time, place, person & situation, Normal mood and affect, memory intact, normal insight and judgment, cooperative         DATA:     Pertinent data reviewed     Lab Results   Component Value Date    A1C 8.5 (A) 12/06/2024    A1C 8.3 (A) 02/05/2024    A1C 12.1 (H) 08/23/2023    A1C 8.3 (H) 05/29/2015    A1C 8.6 (H) 04/22/2014      Cholesterol: 166, done on 2/27/2025.  HDL Cholesterol: 41, done on 2/27/2025.  LDL Cholesterol: 110, done on 2/27/2025.  TriGlycerides 78, done on 2/27/2025.    No results found.  Micro Albumen/Creatinine:    Lab Results   Component Value Date    MICROALBCREA  02/27/2025       Comment:      Unable to calculate due to Urine Microalbumin <0.3 mg/dL          Diabetes:    Lab Results   Component Value Date    A1C 8.5 (A) 2024    A1C 8.3 (A) 2024    A1C 12.1 (H) 2023     (H) 2023     Lab Results   Component Value Date    CHOLEST 166 2025    CHOLEST 194 2023    CHOLEST 156 2015     Lab Results   Component Value Date    HDL 41 2025    HDL 64 (H) 2023    HDL 49 2015     Lab Results   Component Value Date     (H) 2025     (H) 2023    LDL 96 2015     Lab Results   Component Value Date    TRIG 78 2025    TRIG 93 2023    TRIG 57 2015     Lab Results   Component Value Date    AST 14 2025    AST 20 12/15/2023    AST 12 (L) 2023     Lab Results   Component Value Date    ALT 9 (L) 2025    ALT 20 12/15/2023    ALT 17 2023         Recent Labs     25  1206   TSH 0.797            Orders Placed This Encounter   Procedures    POC HemoCue Glucose 201 (Finger stick glucose)    POC Glycohemoglobin [28946]    Lipid Panel     Orders Placed This Encounter    POC HemoCue Glucose 201 (Finger stick glucose)     Order Specific Question:   Release to patient     Answer:   Immediate    POC Glycohemoglobin [09516]     Order Specific Question:   Release to patient     Answer:   Immediate    Lipid Panel     Standing Status:   Future     Standing Expiration Date:   3/21/2026     Order Specific Question:   Release to patient     Answer:   Immediate    semaglutide (OZEMPIC, 0.25 OR 0.5 MG/DOSE,) 2 MG/3ML Subcutaneous Solution Pen-injector     Sig: Inject 0.25 mg into the skin once a week.     Dispense:  9 mL     Refill:  0    empagliflozin (JARDIANCE) 25 MG Oral Tab     Sig: Take 1 tablet (25 mg total) by mouth daily.     Dispense:  90 tablet     Refill:  1    Insulin Pen Needle (PEN NEEDLES) 32G X 4 MM Does not apply Misc     Si each once a week. Use as directed to inject  ozempic weekly     Dispense:  10 each     Refill:  0    docusate sodium 100 MG Oral Cap     Sig: Take 1 capsule (100 mg total) by mouth 2 (two) times daily.     Dispense:  180 capsule     Refill:  0      TSH   Date Value Ref Range Status   03/19/2025 0.797 0.550 - 4.780 uIU/mL Final     TSH (S)   Date Value Ref Range Status   05/29/2015 0.41 0.34 - 5.60 uIU/mL Final     @LAST[facility]     Orders Placed This Encounter   Procedures    PODIATRY - INTERNAL     Referral Priority:   Routine     Referral Type:   OFFICE VISIT     Referred to Provider:   Aaron Snider DPM     Requested Specialty:   PODIATRIST     Number of Visits Requested:   1        This is a specialized patient consultation in endocrinology and required comprehensive review of prior records, as well as current evaluation, with time required for consideration of complex endocrine issues and consultation. For this visit, I personally interviewed the patient, and family member if accompanied, performed the pertinent parts of the history and physical examination. ROS included screening for appropriate endocrine conditions.   Today's diagnosis and plan were reviewed in detail with the patient who states understanding and agrees with plan. I discussed with the patient possible diagnosis, differential diagnosis, need for work up, treatment options, alternatives and side effects.     Please see note for details about time spent which includes:   · pre-visit preparation  · reviewing records  · face to face time with the patient   · timely documentation of the encounter  · ordering medications/tests  · communication with care team  · care coordination    I appreciate the opportunity to be part of your patient's medical care and will keep you, as the referring and primary physicians, informed about the care of your patient. Please feel free to contact me should you have any questions.      The 21st Century Cures Act makes medical notes like these available to  patients in the interest of transparency. Please be advised this is a medical document. Medical documents are intended to carry relevant information, facts as evident, and the clinical opinion of the practitioner. The medical note is intended as peer to peer communication and may appear blunt or direct. It is written in medical language and may contain abbreviations or verbiage that are unfamiliar.     Efrain Galeano MD         [1] No Known Allergies

## 2025-03-21 NOTE — PATIENT INSTRUCTIONS
RTC in 3 mo   Will refer to podiatry   Jardiance 25 mg /day   Ozempic 0.25 Mg/wk. Will increase as tolerated   We gave info to read about lisinopril, rosuvastatin      b A1c: 6.9

## 2025-03-26 ENCOUNTER — TELEPHONE (OUTPATIENT)
Dept: ENDOCRINOLOGY CLINIC | Facility: CLINIC | Age: 60
End: 2025-03-26

## 2025-03-26 NOTE — TELEPHONE ENCOUNTER
Medication  CGM  pump supply Requested:semaglutide (OZEMPIC, 0.25 OR 0.5 MG/DOSE,) 2 MG/3ML Subcutaneous Solution Pen-injector     CoverMyMeds Used:    Key:[id number]     Sig: Inject 0.25 mg into the skin once a week.     DX Code:E11.65    Case Number/Pending Ref#:

## 2025-03-26 NOTE — TELEPHONE ENCOUNTER
Medication Quantity Refills Start End   semaglutide (OZEMPIC, 0.25 OR 0.5 MG/DOSE,) 2 MG/3ML Subcutaneous Solution Pen-injector 9 mL 0 3/21/2025 --   Sig:   Inject 0.25 mg into the skin once a week.      KEY:[id number]

## 2025-04-08 ENCOUNTER — OFFICE VISIT (OUTPATIENT)
Dept: FAMILY MEDICINE CLINIC | Facility: CLINIC | Age: 60
End: 2025-04-08
Payer: COMMERCIAL

## 2025-04-08 VITALS
BODY MASS INDEX: 27.31 KG/M2 | DIASTOLIC BLOOD PRESSURE: 80 MMHG | HEART RATE: 90 BPM | HEIGHT: 64 IN | TEMPERATURE: 99 F | SYSTOLIC BLOOD PRESSURE: 138 MMHG | WEIGHT: 160 LBS

## 2025-04-08 DIAGNOSIS — E11.9 TYPE 2 DIABETES MELLITUS WITHOUT COMPLICATION, WITHOUT LONG-TERM CURRENT USE OF INSULIN (HCC): ICD-10-CM

## 2025-04-08 DIAGNOSIS — Z12.11 COLON CANCER SCREENING: ICD-10-CM

## 2025-04-08 DIAGNOSIS — Z12.31 VISIT FOR SCREENING MAMMOGRAM: ICD-10-CM

## 2025-04-08 DIAGNOSIS — I10 ESSENTIAL HYPERTENSION: ICD-10-CM

## 2025-04-08 DIAGNOSIS — Z00.00 ROUTINE PHYSICAL EXAMINATION: Primary | ICD-10-CM

## 2025-04-08 PROCEDURE — 3075F SYST BP GE 130 - 139MM HG: CPT | Performed by: FAMILY MEDICINE

## 2025-04-08 PROCEDURE — 99396 PREV VISIT EST AGE 40-64: CPT | Performed by: FAMILY MEDICINE

## 2025-04-08 PROCEDURE — 3079F DIAST BP 80-89 MM HG: CPT | Performed by: FAMILY MEDICINE

## 2025-04-08 PROCEDURE — 3008F BODY MASS INDEX DOCD: CPT | Performed by: FAMILY MEDICINE

## 2025-04-08 RX ORDER — ALBUTEROL SULFATE 90 UG/1
2 INHALANT RESPIRATORY (INHALATION) EVERY 4 HOURS PRN
Qty: 1 EACH | Refills: 2 | Status: SHIPPED | OUTPATIENT
Start: 2025-04-08

## 2025-04-08 RX ORDER — MOMETASONE FUROATE 1 MG/G
CREAM TOPICAL
Qty: 45 G | Refills: 0 | Status: SHIPPED | OUTPATIENT
Start: 2025-04-08

## 2025-04-08 NOTE — PROGRESS NOTES
Subjective:   Patient ID: Terry Jaimes is a 60 year old female.    Patient is here for a routine physical exam. No acute issues. Pt had blood work prior to the appointment. Lab work was stable and good. Hgba1c much improved. Now.   Diet and exercise have been good.   Past medical history, family history, and social history were reviewed.  Patient is also here for follow up on chronic medical issues- hypertension, DM. The patient is compliant with medications and no side effects. There are no acute issues and patient is requesting refills on inhaler and steroid cream. The patient states medications have been helpful and effective.  Patient sees endocrine for history of diabetes.  Has been doing well and stable.           History/Other:   Review of Systems   Constitutional: Negative.  Negative for fever.   HENT: Negative.     Eyes: Negative.    Respiratory: Negative.  Negative for cough and shortness of breath.    Cardiovascular: Negative.  Negative for chest pain.   Gastrointestinal: Negative.  Negative for abdominal pain.   Endocrine: Negative.    Genitourinary: Negative.  Negative for difficulty urinating and dysuria.   Musculoskeletal: Negative.    Skin: Negative.    Allergic/Immunologic: Negative.    Neurological: Negative.  Negative for dizziness and headaches.   Hematological: Negative.    Psychiatric/Behavioral: Negative.  Negative for dysphoric mood. The patient is not nervous/anxious.      Current Outpatient Medications   Medication Sig Dispense Refill    albuterol 108 (90 Base) MCG/ACT Inhalation Aero Soln Inhale 2 puffs into the lungs every 4 (four) hours as needed for Wheezing or Shortness of Breath. 1 each 2    Mometasone Furoate 0.1 % External Cream Apply a small dab to affected area of skin once per day as needed. 45 g 0    semaglutide (OZEMPIC, 0.25 OR 0.5 MG/DOSE,) 2 MG/3ML Subcutaneous Solution Pen-injector Inject 0.25 mg into the skin once a week. 9 mL 0    empagliflozin (JARDIANCE) 25 MG  Oral Tab Take 1 tablet (25 mg total) by mouth daily. 90 tablet 1    Insulin Pen Needle (PEN NEEDLES) 32G X 4 MM Does not apply Misc 1 each once a week. Use as directed to inject ozempic weekly 10 each 0    docusate sodium 100 MG Oral Cap Take 1 capsule (100 mg total) by mouth 2 (two) times daily. 180 capsule 0    amLODIPine 5 MG Oral Tab Take 1 tablet (5 mg total) by mouth daily. 90 tablet 3    Lancet Devices ([employer] LANCING DEV) Does not apply Misc 1 Device by Finger stick route 3 (three) times daily. 1 each 1    OneTouch Delica Lancets 33G Does not apply Misc 1 Lancet by Finger stick route 3 (three) times daily. 300 each 3    Glucose Blood (ONETOUCH ULTRA) In Vitro Strip Use 1 new strip by in vitro route 3 (three) times daily for blood glucose monitoring 300 strip 3    lidocaine 5 % External Patch Place 1 patch onto the skin daily. 7 patch 0    Blood Glucose Monitoring Suppl (ONETOUCH ULTRA 2) w/Device Does not apply Kit Use to check blood sugar once daily. 1 kit 0    promethazine-codeine 6.25-10 MG/5ML Oral Syrup Take 1  teaspoons by mouth every 6 hours as needed for cough 180 mL 0    guaiFENesin-codeine (CHERATUSSIN AC) 100-10 MG/5ML Oral Solution Take 5 mL by mouth every 6 (six) hours as needed for cough. Medication may causes sedation. Not to operate heavy machinery or drive on medication. 140 mL 0    ibuprofen 800 MG Oral Tab Take 1 tablet (800 mg total) by mouth every 8 (eight) hours as needed for Pain. 30 tablet 2     Allergies:Allergies[1]    Objective:   Physical Exam  Constitutional:       Appearance: Normal appearance. She is well-developed.   HENT:      Head: Normocephalic and atraumatic.      Right Ear: Tympanic membrane and external ear normal.      Left Ear: Tympanic membrane and external ear normal.      Nose: Nose normal.      Mouth/Throat:      Mouth: Mucous membranes are moist.      Pharynx: No posterior oropharyngeal erythema.   Eyes:      Conjunctiva/sclera: Conjunctivae normal.    Neck:      Thyroid: No thyroid mass, thyromegaly or thyroid tenderness.   Cardiovascular:      Rate and Rhythm: Normal rate and regular rhythm.      Pulses: Normal pulses.      Heart sounds: Normal heart sounds.   Pulmonary:      Effort: Pulmonary effort is normal.      Breath sounds: Normal breath sounds.   Abdominal:      General: Abdomen is flat. There is no distension.      Palpations: Abdomen is soft.      Tenderness: There is no abdominal tenderness. There is no guarding or rebound.   Genitourinary:     Vagina: Normal.   Musculoskeletal:         General: Normal range of motion.      Cervical back: Normal range of motion and neck supple.   Lymphadenopathy:      Cervical: No cervical adenopathy.   Skin:     General: Skin is warm.   Neurological:      General: No focal deficit present.      Mental Status: She is alert and oriented to person, place, and time.      Deep Tendon Reflexes: Reflexes are normal and symmetric. Reflexes normal.      Comments: Bilateral diabetic exam is normal, visualized feet and the appearance is normal.  Bilateral sensation of both feet is normal.  Pulsation pedal pulse exam of both lower legs/feet is normal as well.         Psychiatric:         Mood and Affect: Mood normal.         Behavior: Behavior normal.         Thought Content: Thought content normal.         Judgment: Judgment normal.         Assessment & Plan:   1. Routine physical examination:  - Exam is unremarkable. Blood tests were discussed and reviewed. Encouraged healthy diet and activity. To call if problems or any significant symptoms. Routine follow up with gyne for well woman exam. Also renewed medications as requested.        2. Essential hypertension:  - Stable: medication reviewed; To continue present treatment; To call if problems; Routine follow up in 6-12 months.     3. Type 2 diabetes mellitus without complication, without long-term current use of insulin:  - Stable, continue present management and follow up  with endocrine.     4. Colon cancer screening:  - After discussion pt will be doing stool testing, Follow up and further management after testing     5. Visit for screening mammogram:  - Mammogram ordered as requested; Follow up and further management after testing         No orders of the defined types were placed in this encounter.      Meds This Visit:  Requested Prescriptions     Signed Prescriptions Disp Refills    albuterol 108 (90 Base) MCG/ACT Inhalation Aero Soln 1 each 2     Sig: Inhale 2 puffs into the lungs every 4 (four) hours as needed for Wheezing or Shortness of Breath.    Mometasone Furoate 0.1 % External Cream 45 g 0     Sig: Apply a small dab to affected area of skin once per day as needed.       Imaging & Referrals:  [facility] PRASANNA 2D+3D SCREENING BILAT (CPT=77067/25897)         [1] No Known Allergies

## 2025-04-16 ENCOUNTER — TELEPHONE (OUTPATIENT)
Dept: FAMILY MEDICINE CLINIC | Facility: CLINIC | Age: 60
End: 2025-04-16

## 2025-04-19 RX ORDER — BLOOD SUGAR DIAGNOSTIC
STRIP MISCELLANEOUS 3 TIMES DAILY
Qty: 300 STRIP | Refills: 3 | Status: SHIPPED | OUTPATIENT
Start: 2025-04-19

## 2025-04-19 NOTE — TELEPHONE ENCOUNTER
Message noted: Chart reviewed and may refill diabetic supplies as requested. Prescription sent to listed pharmacy. Pharmacy to notify patient. \

## 2025-04-24 ENCOUNTER — OFFICE VISIT (OUTPATIENT)
Dept: PODIATRY CLINIC | Facility: CLINIC | Age: 60
End: 2025-04-24
Payer: COMMERCIAL

## 2025-04-24 DIAGNOSIS — B35.1 ONYCHOMYCOSIS: ICD-10-CM

## 2025-04-24 DIAGNOSIS — E11.9 TYPE 2 DIABETES MELLITUS WITHOUT COMPLICATION, WITHOUT LONG-TERM CURRENT USE OF INSULIN (HCC): Primary | ICD-10-CM

## 2025-04-24 PROCEDURE — 99203 OFFICE O/P NEW LOW 30 MIN: CPT | Performed by: STUDENT IN AN ORGANIZED HEALTH CARE EDUCATION/TRAINING PROGRAM

## 2025-04-24 RX ORDER — CICLOPIROX 80 MG/ML
SOLUTION TOPICAL
Qty: 1 EACH | Refills: 3 | Status: SHIPPED | OUTPATIENT
Start: 2025-04-24

## 2025-04-24 NOTE — PROGRESS NOTES
[facility] Podiatry  Progress Note      Terry Jaimes is a 60 year old female.   Chief Complaint   Patient presents with    Diabetic Foot Care     Consult - last BuJ3K=4.9 from 3/21/25 when she was seen by karyn Galeano - has no c/o regarding her feet - this AM her FV=083- no toenail trimming needed             HPI:     Patient is a pleasant 60-year-old female presents to clinic for evaluation of bilateral lower extremity.  Patient is non-insulin-dependent diabetic female.  Admits that she has worked from an A1c of 12% down to 6.9.  Denies any pedal injuries or trauma.    Allergies: Patient has no known allergies.    Current Medications[1]   Past Medical History[2]   Past Surgical History[3]   Family History[4]   Social Hx on file[5]        REVIEW OF SYSTEMS:     Denies nause, fever, chills  No calf pain  Denies chest pain or SOB      EXAM:   There were no vitals taken for this visit.  GENERAL: well developed, well nourished, in no apparent distress  EXTREMITIES:   1. Integument: Normal skin temperature and turgor  2. Vascular: Dorsalis pedis two out of four bilateral and posterior tibial pulses two out of   four bilateral, capillary refill normal.   3. Musculoskeletal: All muscle groups are graded 5 out of 5 in the foot and ankle.   4. Neurological: Normal sharp dull sensation; reflexes normal.    Bilateral barefoot skin diabetic exam is normal, visualized feet and the appearance is normal.  Bilateral monofilament/sensation of both feet is normal.  Pulsation pedal pulse exam of both lower legs/feet is normal as well.               ASSESSMENT AND PLAN:   Diagnoses and all orders for this visit:    Type 2 diabetes mellitus without complication, without long-term current use of insulin ([facility])        Plan:       -Patient examined, chart history reviewed.  -Discussed importance of proper pedal hygiene, regular foot checks, and tight glucose control.  -Ambulate with supportive shoes and inserts and avoid  walking barefoot.  -Educated patient on acute signs of infection advised patient to seek immediate medical attention if symptoms arise.    RTC in 1 year    The patient indicates understanding of these issues and agrees to the plan.        Odette Craig DPM        [1]   Current Outpatient Medications   Medication Sig Dispense Refill    semaglutide (OZEMPIC, 0.25 OR 0.5 MG/DOSE,) 2 MG/3ML Subcutaneous Solution Pen-injector Inject 0.5 mg into the skin once a week. 9 mL 0    ONETOUCH ULTRA In Vitro Strip TEST THREE TIMES DAILY 300 strip 3    albuterol 108 (90 Base) MCG/ACT Inhalation Aero Soln Inhale 2 puffs into the lungs every 4 (four) hours as needed for Wheezing or Shortness of Breath. 1 each 2    Mometasone Furoate 0.1 % External Cream Apply a small dab to affected area of skin once per day as needed. 45 g 0    empagliflozin (JARDIANCE) 25 MG Oral Tab Take 1 tablet (25 mg total) by mouth daily. 90 tablet 1    Insulin Pen Needle (PEN NEEDLES) 32G X 4 MM Does not apply Misc 1 each once a week. Use as directed to inject ozempic weekly 10 each 0    docusate sodium 100 MG Oral Cap Take 1 capsule (100 mg total) by mouth 2 (two) times daily. 180 capsule 0    amLODIPine 5 MG Oral Tab Take 1 tablet (5 mg total) by mouth daily. 90 tablet 3    Lancet Devices (ONETOUCH DELICA LANCING DEV) Does not apply Misc 1 Device by Finger stick route 3 (three) times daily. 1 each 1    OneTouch Delica Lancets 33G Does not apply Misc 1 Lancet by Finger stick route 3 (three) times daily. 300 each 3    lidocaine 5 % External Patch Place 1 patch onto the skin daily. 7 patch 0    Blood Glucose Monitoring Suppl (ONETOUCH ULTRA 2) w/Device Does not apply Kit Use to check blood sugar once daily. 1 kit 0    promethazine-codeine 6.25-10 MG/5ML Oral Syrup Take 1  teaspoons by mouth every 6 hours as needed for cough 180 mL 0    guaiFENesin-codeine (CHERATUSSIN AC) 100-10 MG/5ML Oral Solution Take 5 mL by mouth every 6 (six) hours as needed  for cough. Medication may causes sedation. Not to operate heavy machinery or drive on medication. 140 mL 0    ibuprofen 800 MG Oral Tab Take 1 tablet (800 mg total) by mouth every 8 (eight) hours as needed for Pain. 30 tablet 2   [2]   Past Medical History:   Breast hypertrophy    reduction mammoplasty 10/2010    Hypertension    Lipid screening    per NG    Mild intermittent asthma (HCC)    drug therapy    Nephrolithiasis    lithotripsy   [3]   Past Surgical History:  Procedure Laterality Date    Lithotripsy  01/01/1995    Reduction left      2010    Reduction of large breast  01/01/2010    Reduction right      2010   [4]   Family History  Problem Relation Age of Onset    Macular degeneration Mother     Diabetes Mother 68    Hypertension Other         family h/o    Glaucoma Neg    [5]   Social History  Socioeconomic History    Marital status:    Tobacco Use    Smoking status: Never    Smokeless tobacco: Never   Vaping Use    Vaping status: Never Used   Substance and Sexual Activity    Alcohol use: No    Drug use: Never   Other Topics Concern    Caffeine Concern Yes     Comment: soda-16 oz./day

## 2025-05-20 NOTE — TELEPHONE ENCOUNTER
[facility] sent relaying message below - patient advised to schedule apt via [facility] or by calling the clinic    No

## 2025-06-09 DIAGNOSIS — I10 PRIMARY HYPERTENSION: ICD-10-CM

## 2025-06-09 DIAGNOSIS — E11.9 TYPE 2 DIABETES MELLITUS WITHOUT RETINOPATHY (HCC): ICD-10-CM

## 2025-06-09 DIAGNOSIS — R73.09 HIGH GLUCOSE LEVEL: ICD-10-CM

## 2025-06-09 DIAGNOSIS — I10 ESSENTIAL HYPERTENSION: ICD-10-CM

## 2025-06-09 DIAGNOSIS — E11.65 UNCONTROLLED TYPE 2 DIABETES MELLITUS WITH HYPERGLYCEMIA (HCC): ICD-10-CM

## 2025-06-09 DIAGNOSIS — E11.65 HYPERGLYCEMIA DUE TO DIABETES MELLITUS (HCC): ICD-10-CM

## 2025-06-10 RX ORDER — SEMAGLUTIDE 0.68 MG/ML
0.5 INJECTION, SOLUTION SUBCUTANEOUS WEEKLY
Qty: 9 ML | Refills: 0 | Status: SHIPPED | OUTPATIENT
Start: 2025-06-10

## 2025-06-10 NOTE — TELEPHONE ENCOUNTER
Endocrine Refill protocol for oral and injectable diabetic medications    Protocol Criteria:  PASSED  Reason: N/A    If all below requirements are met, send a 90-day supply with 1 refill per provider protocol.    Verify appointment with Endocrinology completed in the last 6 months or scheduled in the next 3 months.  Verify A1C has been completed within the last 6 months and is below 8.5%     Last completed office visit: 3/21/2025 Efrain Galeano MD   Next scheduled Follow up:   Future Appointments   Date Time Provider Department Center   6/27/2025 11:00 AM Efrain Galeano MD [facility]      Last A1c result: Last A1c value was 6.9% done 3/21/2025.

## 2025-06-21 RX ORDER — AMLODIPINE BESYLATE 5 MG/1
5 TABLET ORAL DAILY
Qty: 90 TABLET | Refills: 3 | Status: SHIPPED | OUTPATIENT
Start: 2025-06-21

## 2025-06-21 NOTE — TELEPHONE ENCOUNTER
Message noted: Chart reviewed and may refill medication as requested. Prescription sent to listed pharmacy. Pharmacy to notify patient. Pt notified through [employer]

## 2025-06-30 ENCOUNTER — TELEPHONE (OUTPATIENT)
Dept: FAMILY MEDICINE CLINIC | Facility: CLINIC | Age: 60
End: 2025-06-30

## (undated) NOTE — LETTER
10/18/2021              Terry Jaimes        [id number] [address]        [address] [address]         Dear Sami Moran records indicate that the tests ordered for you by J Luis Alfredo MD  have not been done.   If you have, in fact, already

## (undated) NOTE — LETTER
ASTHMA ACTION PLAN for Liam Garcia     : 1965     Date: 21  Doctor:  Leisa Hearn MD  Phone for doctor or clinic: Ivan Liao [address] [address] [phone]      ACT Sc SULFATE IN    Inhale  into the lungs. 3. Red - Stop!  Danger! <50% Personal Best Peak Flow  Continue Controller Medications But ADD:   Medicine not helping  Breathing is hard and fast  Nose opens wide  Can't walk  Ribs show  Can't talk well

## (undated) NOTE — LETTER
04/05/18        Terry [address] [address]      Dear Liliya Knox,    [facility] records indicate that you have outstanding lab work and or testing that was ordered for you and has not yet been completed:          Hemoglobin

## (undated) NOTE — LETTER
5/7/2021              Terry Jaimes        [address] [address]        [address] [address]         Dear Ly Zhou records indicate that the Mammogram ordered for you by Emigdio Alarcon MD  have not been done.   If you have, in fact, alread

## (undated) NOTE — LETTER
05/20/21        [place]erry [place] [address]      Dear Samantha Murguia records indicate that you have outstanding lab work and or testing that was ordered for you and has not yet been completed:  Orders Placed This

## (undated) NOTE — LETTER
4/7/2020              Terry Jaimes        [address] [address]         Dear Suzi Wilson,    This letter is to inform you that our office has made several attempts to reach you by phone without success.   We were attempti

## (undated) NOTE — LETTER
10/18/2021              Terry Jaimes        [address] [address]        [address] [address]         Dear Jesús Lindo records indicate that the tests ordered for you by Jacquelin Willard MD  have not been done.   If you have, in fact, already

## (undated) NOTE — LETTER
09/13/19        [place] [address] [address]      Dear Beverly Rahman records indicate that you have outstanding lab work and or testing that was ordered for you and has not yet been completed:  Orders Placed This

## (undated) NOTE — MR AVS SNAPSHOT
[facility] [place] [address] [address] [address] [phone]               Thank you for choosing us for your health care visit with Jacklyn Barker MD.  We are glad to serve you and happy to provide you with this summary of y Take 1 to 2 teaspoons by mouth QID as needed for cough   Commonly known as:  PHENERGAN with CODEINE           * Notice: This list has 2 medication(s) that are the same as other medications prescribed for you.  Read the directions carefully, and ask your do EAT THESE FOODS MORE OFTEN: EAT THESE FOODS LESS OFTEN:   Make half your plate fruits and vegetables Highly refined, white starches including white bread, rice and pasta   Eat plenty of protein, keep the fat content low Sugars:  sodas and sports drinks, ca

## (undated) NOTE — Clinical Note
AUTHORIZATION FOR SURGICAL OPERATION OR OTHER PROCEDURE    1. I hereby authorize Dr. Chirag Meza, and [facility] staff assigned to my case to perform the following operation and/or procedure at the [facility]:    Colposcopy    2.   My physi Relationship to Patient:             Parent    Responsible person                            Spouse  In case of minor or                     Other  _____________   Incompetent name:  __________________________________________________

## (undated) NOTE — LETTER
12/28/2017              Terry Jaimes        [address] [address]         Dear Parker Coulter,    This letter is to inform you that our office has made several attempts to reach you by phone without success.   We were attemp

## (undated) NOTE — LETTER
January 31, 2024      No Recipients     Patient: Terry Jaimes   YOB: 1965   Date of Visit: 1/31/2024       Dear Dr. Reid Recipients:    Thank you for referring Terry Jaimes to me for evaluation. Here is my assessment and plan of care:    Terry Jaimes is a 59 year old female.    HPI:     HPI    New patient here for a diabetic exam per Dr. Becerril.  She states she woke up on 1/23/24 with horizontal double vision.  Patient also notices her left eye is turning in towards her nose since 1/26/24.  She also complains of headaches and pain behind her left eye.  Patient was seen at [facility] on 1/23/24 she has plano glasses on order with prism ground in [place].  Patient was seen by Dr. Marquez @ [facility] 01/24/24. He recommended and MRI, but patient is here for a second opinion.  Patient wears soft contact lenses.      Pt has been a diabetic for 4 months       Pt's diabetes is currently controlled by pills  Pt checks BS 4 times a day   Pt's last blood sugar was  136 this morning  Last HA1C was 6.9 on on 1/30/24  Endocrinologist: Dr. Efrain Galeano (pt has an appt on 2/05/24)    Last edited by Yesika Santamaria OT on 1/31/2024  1:34 PM.        Patient History:  Past Medical History:   Diagnosis Date    Breast hypertrophy 2010    reduction mammoplasty 10/2010    Hypertension     Lipid screening 4/17/2014    per NG    Mild intermittent asthma     drug therapy    Nephrolithiasis 1995    lithotripsy       Surgical History: Terry Jaimes has a past surgical history that includes lithotripsy (01/01/1995); reduction of large breast (01/01/2010); reduction left (2010); and reduction right (2010).    Family History   Problem Relation Age of Onset    Macular degeneration Mother     Diabetes Mother 68    Hypertension Other         family h/o    Glaucoma Neg        Social History:   Social History     Socioeconomic History    Marital status:    Tobacco Use    Smoking status:  Never    Smokeless tobacco: Never   Vaping Use    Vaping Use: Never used   Substance and Sexual Activity    Alcohol use: No    Drug use: Never   Other Topics Concern    Caffeine Concern Yes     Comment: soda-16 oz./day       Medications:  Current Outpatient Medications   Medication Sig Dispense Refill    lidocaine 5 % External Patch Place 1 patch onto the skin daily. 7 patch 0    amLODIPine 5 MG Oral Tab Take 1 tablet (5 mg total) by mouth daily. 90 tablet 1    empagliflozin (JARDIANCE) 10 MG Oral Tab Take 1 tablet (10 mg total) by mouth daily. 90 tablet 1    Glucose Blood (ONETOUCH ULTRA) In Vitro Strip Check blood glucose 3 times daily. 300 each 1    Blood Glucose Monitoring Suppl (ONETOUCH ULTRA 2) w/Device Does not apply Kit Use to check blood sugar once daily. 1 kit 0    albuterol 108 (90 Base) MCG/ACT Inhalation Aero Soln Inhale 2 puffs into the lungs every 4 (four) hours as needed for Wheezing or Shortness of Breath. 1 each 2    promethazine-codeine 6.25-10 MG/5ML Oral Syrup Take 1  teaspoons by mouth every 6 hours as needed for cough 180 mL 0    guaiFENesin-codeine (CHERATUSSIN AC) 100-10 MG/5ML Oral Solution Take 5 mL by mouth every 6 (six) hours as needed for cough. Medication may causes sedation. Not to operate heavy machinery or drive on medication. 140 mL 0    ibuprofen 800 MG Oral Tab Take 1 tablet (800 mg total) by mouth every 8 (eight) hours as needed for Pain. 30 tablet 2       Allergies:  No Known Allergies    ROS:     ROS    Positive for: Endocrine, Eyes  Negative for: Constitutional, Gastrointestinal, Neurological, Skin, Genitourinary, Musculoskeletal, HENT, Cardiovascular, Respiratory, Psychiatric, Allergic/Imm, Heme/Lymph  Last edited by Yesika Santamaria OT on 1/31/2024  1:16 PM.          PHYSICAL EXAM:     Base Eye Exam       Visual Acuity (Snellen - Linear)         Right Left    Dist cc 20/20 20/20    Near cc 20/20 20/20      Correction: Contacts              Tonometry (Icare, 1:28 PM)          Right Left    Pressure 20 20              Pupils         Pupils    Right PERRL    Left PERRL              Visual Fields         Left Right     Full Full              Extraocular Movement    OD ortho  OS ET             Dilation       Both eyes: 1.0% Mydriacyl and 2.5% Jesse Synephrine @ 1:28 PM              Dilation #2       Both eyes: 1.0% Mydriacyl and 2.5% Jesse Synephrine @ 1:28 PM                  Slit Lamp and Fundus Exam       Slit Lamp Exam         Right Left    Lids/Lashes Dermatochalasis, Meibomian gland dysfunction Dermatochalasis, Meibomian gland dysfunction    Conjunctiva/Sclera Nasal/temp pinguecula Temp pinguecula, Ocular Melanosis nasally    Cornea 2+ Arcus 2+ Arcus    Anterior Chamber Deep and quiet Deep and quiet    Iris Normal Normal    Lens 2+ Nuclear sclerosis 2+ Nuclear sclerosis    Vitreous Clear Clear              Fundus Exam         Right Left    Disc Sloping margin, Temporal crescent, Tilted disc Sloping margin, Temporal crescent, Tilted disc    C/D Ratio 0.85 0.85    Macula Normal-no BDR Normal-no BDR    Vessels Normal Normal    Periphery Normal Normal                  Refraction       Wearing Rx         Sphere Cylinder    Right +1.75 Sphere    Left +1.75 Sphere      Type: OTC reading only              Manifest Refraction (Auto)         Sphere Cylinder Axis    Right -8.00 +1.25 005    Left -6.50 Sphere    Patient is happy with her contacts and OTC reading glasses                    ASSESSMENT/PLAN:     Diagnoses and Plan:     Type 2 diabetes mellitus without retinopathy (HCC)  Diabetes type II: no background of retinopathy, no signs of neovascularization noted.  Discussed ocular and systemic benefits of blood sugar control.  Diagnosis and treatment discussed in detail with patient.    Will see patient in 1 year for a diabetic exam    Age-related nuclear cataract of both eyes  Discussed mild cataracts with patient.  No treatment recommended at this time.       6th nerve palsy, left  Diagnosis  discussed with patient.  Recommend evaluation with Dr. Sales    Diplopia  Will see Dr. Sales on 2/02/24    No orders of the defined types were placed in this encounter.      Meds This Visit:  Requested Prescriptions      No prescriptions requested or ordered in this encounter        Follow up instructions:  Return in about 2 days (around 2/2/2024) for Diplopia evaluation then 1 year for a diabetic exam.    1/31/2024  Scribed by: Orion Krishnan MD        If you have questions, please do not hesitate to call me. I look forward to following Bernaethan along with you.    Sincerely,        Orion Krishnan MD        CC:   No Recipients    Document electronically generated by: Orion Krishnan MD

## (undated) NOTE — LETTER
12/05/17        Terry [address] [address]      Dear Marcelino Real records indicate that you have outstanding lab work and or testing that was ordered for you and has not yet been completed:          Hemoglobin

## (undated) NOTE — LETTER
ASTHMA ACTION PLAN for Jaylene      : 1965     Date: 21  Doctor:  J Luis Alfredo MD  Phone for doctor or clinic: [place] [address]  [facility] [address] [phone] a lungs.     ALBUTEROL SULFATE IN    Inhale  into the lungs. 3. Red - Stop!  Danger! <50% Personal Best Peak Flow  Continue Controller Medications But ADD:   Medicine not helping  Breathing is hard and fast  Nose opens wide  Can't walk  Ribs